# Patient Record
Sex: FEMALE | Race: WHITE | Employment: FULL TIME | URBAN - METROPOLITAN AREA
[De-identification: names, ages, dates, MRNs, and addresses within clinical notes are randomized per-mention and may not be internally consistent; named-entity substitution may affect disease eponyms.]

---

## 2017-01-19 RX ORDER — ESOMEPRAZOLE MAGNESIUM 40 MG/1
CAPSULE, DELAYED RELEASE ORAL
Qty: 180 CAPSULE | Refills: 0 | Status: SHIPPED | OUTPATIENT
Start: 2017-01-19 | End: 2017-03-31

## 2017-01-20 RX ORDER — ESOMEPRAZOLE MAGNESIUM 40 MG/1
CAPSULE, DELAYED RELEASE ORAL
Qty: 180 CAPSULE | Refills: 0 | Status: SHIPPED | OUTPATIENT
Start: 2017-01-20 | End: 2017-05-16

## 2017-03-17 ENCOUNTER — TELEPHONE (OUTPATIENT)
Dept: FAMILY MEDICINE CLINIC | Facility: CLINIC | Age: 57
End: 2017-03-17

## 2017-03-17 NOTE — TELEPHONE ENCOUNTER
Pt called ans stated that she over the weekend she was having cold like symptoms. Since then she has noticed water retention, SOB , heavy legs, dizziness and had a sharp pain in her chest 3 days ago that has gone away.   Pt denies fever nausea headaches o

## 2017-03-31 ENCOUNTER — APPOINTMENT (OUTPATIENT)
Dept: LAB | Age: 57
End: 2017-03-31
Attending: FAMILY MEDICINE
Payer: COMMERCIAL

## 2017-03-31 ENCOUNTER — OFFICE VISIT (OUTPATIENT)
Dept: FAMILY MEDICINE CLINIC | Facility: CLINIC | Age: 57
End: 2017-03-31

## 2017-03-31 ENCOUNTER — HOSPITAL ENCOUNTER (OUTPATIENT)
Dept: GENERAL RADIOLOGY | Age: 57
Discharge: HOME | End: 2017-03-31
Attending: FAMILY MEDICINE
Payer: COMMERCIAL

## 2017-03-31 VITALS
BODY MASS INDEX: 25.95 KG/M2 | DIASTOLIC BLOOD PRESSURE: 74 MMHG | RESPIRATION RATE: 16 BRPM | WEIGHT: 152 LBS | HEART RATE: 94 BPM | TEMPERATURE: 98 F | HEIGHT: 64 IN | OXYGEN SATURATION: 97 % | SYSTOLIC BLOOD PRESSURE: 134 MMHG

## 2017-03-31 DIAGNOSIS — R05.3 PERSISTENT COUGH FOR 3 WEEKS OR LONGER: ICD-10-CM

## 2017-03-31 DIAGNOSIS — R06.00 DYSPNEA ON EXERTION: ICD-10-CM

## 2017-03-31 DIAGNOSIS — R05.3 PERSISTENT COUGH FOR 3 WEEKS OR LONGER: Primary | ICD-10-CM

## 2017-03-31 DIAGNOSIS — E55.9 VITAMIN D DEFICIENCY: ICD-10-CM

## 2017-03-31 DIAGNOSIS — L65.9 HAIR LOSS: ICD-10-CM

## 2017-03-31 DIAGNOSIS — E78.00 PURE HYPERCHOLESTEROLEMIA: ICD-10-CM

## 2017-03-31 PROCEDURE — 80053 COMPREHEN METABOLIC PANEL: CPT

## 2017-03-31 PROCEDURE — 36415 COLL VENOUS BLD VENIPUNCTURE: CPT

## 2017-03-31 PROCEDURE — 99213 OFFICE O/P EST LOW 20 MIN: CPT | Performed by: FAMILY MEDICINE

## 2017-03-31 PROCEDURE — 80061 LIPID PANEL: CPT

## 2017-03-31 PROCEDURE — 84443 ASSAY THYROID STIM HORMONE: CPT

## 2017-03-31 PROCEDURE — 71020 XR CHEST PA + LAT CHEST (CPT=71020): CPT

## 2017-03-31 PROCEDURE — 82306 VITAMIN D 25 HYDROXY: CPT

## 2017-03-31 PROCEDURE — 84439 ASSAY OF FREE THYROXINE: CPT

## 2017-04-03 ENCOUNTER — HOSPITAL ENCOUNTER (OUTPATIENT)
Dept: CV DIAGNOSTICS | Facility: HOSPITAL | Age: 57
Discharge: HOME | End: 2017-04-03
Attending: FAMILY MEDICINE
Payer: COMMERCIAL

## 2017-04-03 ENCOUNTER — TELEPHONE (OUTPATIENT)
Dept: FAMILY MEDICINE CLINIC | Facility: CLINIC | Age: 57
End: 2017-04-03

## 2017-04-03 DIAGNOSIS — R06.00 DYSPNEA ON EXERTION: ICD-10-CM

## 2017-04-03 DIAGNOSIS — E78.00 PURE HYPERCHOLESTEROLEMIA: Primary | ICD-10-CM

## 2017-04-03 PROCEDURE — 93306 TTE W/DOPPLER COMPLETE: CPT

## 2017-04-03 PROCEDURE — 93306 TTE W/DOPPLER COMPLETE: CPT | Performed by: FAMILY MEDICINE

## 2017-04-03 NOTE — PROGRESS NOTES
HPI:   Danica Steven is a 61year old female who presents for a persistent dry cough and dyspnea on exertion for  3  weeks. Patient reports dry cough, denies fever.       Current Outpatient Prescriptions:  ESOMEPRAZOLE MAGNESIUM 40 MG Oral Capsule Delayed 3/13/2016    Comment Procedure: COLONOSCOPY;  Surgeon: Tere Moraels DO;  Location: [facility] ENDOSCOPY    KNEE REPLACEMENT SURGERY Right 3/7/2016      Family History   Problem Relation Age of Onset   • Heart Disease Father    • Diabetes Father    • Heart Dis worsen.

## 2017-05-12 ENCOUNTER — TELEPHONE (OUTPATIENT)
Dept: FAMILY MEDICINE CLINIC | Facility: CLINIC | Age: 57
End: 2017-05-12

## 2017-05-12 NOTE — TELEPHONE ENCOUNTER
Pt has questions about staring a water pill. Dr Coby Juárez will be out so we had to reschedule appt but pt has questions/ Please advise. Thank you.

## 2017-05-12 NOTE — TELEPHONE ENCOUNTER
S/w pt. She was r/s'd from May to July to discuss starting a possible water pill. I am not sure why so far out from orig appt. States when in cancun for a week she took two 25mg pills of her sisters that made her feel wonderful and really helped her edema.

## 2017-05-16 ENCOUNTER — OFFICE VISIT (OUTPATIENT)
Dept: FAMILY MEDICINE CLINIC | Facility: CLINIC | Age: 57
End: 2017-05-16

## 2017-05-16 VITALS
DIASTOLIC BLOOD PRESSURE: 78 MMHG | HEIGHT: 64 IN | SYSTOLIC BLOOD PRESSURE: 128 MMHG | RESPIRATION RATE: 16 BRPM | WEIGHT: 156 LBS | BODY MASS INDEX: 26.63 KG/M2 | TEMPERATURE: 98 F | HEART RATE: 80 BPM

## 2017-05-16 DIAGNOSIS — R00.2 PALPITATIONS: Primary | ICD-10-CM

## 2017-05-16 DIAGNOSIS — E78.00 PURE HYPERCHOLESTEROLEMIA: ICD-10-CM

## 2017-05-16 DIAGNOSIS — K21.00 REFLUX ESOPHAGITIS: ICD-10-CM

## 2017-05-16 DIAGNOSIS — R60.0 BILATERAL EDEMA OF LOWER EXTREMITY: ICD-10-CM

## 2017-05-16 PROCEDURE — 93000 ELECTROCARDIOGRAM COMPLETE: CPT | Performed by: FAMILY MEDICINE

## 2017-05-16 PROCEDURE — 99214 OFFICE O/P EST MOD 30 MIN: CPT | Performed by: FAMILY MEDICINE

## 2017-05-16 RX ORDER — ATORVASTATIN CALCIUM 40 MG/1
40 TABLET, FILM COATED ORAL NIGHTLY
Qty: 90 TABLET | Refills: 1 | Status: SHIPPED | OUTPATIENT
Start: 2017-05-16 | End: 2018-05-01

## 2017-05-16 RX ORDER — HYDROCHLOROTHIAZIDE 12.5 MG/1
12.5 CAPSULE, GELATIN COATED ORAL DAILY
Qty: 30 CAPSULE | Refills: 5 | Status: SHIPPED | OUTPATIENT
Start: 2017-05-16 | End: 2018-05-01

## 2017-05-16 RX ORDER — ESOMEPRAZOLE MAGNESIUM 40 MG/1
CAPSULE, DELAYED RELEASE ORAL
Qty: 180 CAPSULE | Refills: 1 | Status: SHIPPED | OUTPATIENT
Start: 2017-05-16 | End: 2018-05-01

## 2017-05-16 NOTE — PROGRESS NOTES
Yaz Ortega is a 61year old female. HPI:   Patient has 2 main issues. First is recurrent leg swelling particularly during hot days or with travel. She borrowed hydrochlorothiazide from a friend and felt that it gave her significant relief.   She woul • High cholesterol    • Glucose intolerance (pre-diabetes)    • Visual impairment      glasses   • Back problem    • Scleroderma (HCC)       Social History:    Smoking Status: Former Smoker                   Packs/Day: 0.00  Years:           Types: Cigar

## 2017-06-02 RX ORDER — ESOMEPRAZOLE MAGNESIUM 40 MG/1
CAPSULE, DELAYED RELEASE ORAL
Qty: 180 CAPSULE | Refills: 0 | Status: SHIPPED | OUTPATIENT
Start: 2017-06-02 | End: 2018-05-01

## 2018-03-16 ENCOUNTER — APPOINTMENT (OUTPATIENT)
Dept: LAB | Facility: HOSPITAL | Age: 58
End: 2018-03-16
Attending: FAMILY MEDICINE
Payer: COMMERCIAL

## 2018-03-16 DIAGNOSIS — E78.00 PURE HYPERCHOLESTEROLEMIA: ICD-10-CM

## 2018-03-16 LAB
ALBUMIN SERPL-MCNC: 3.6 G/DL (ref 3.5–4.8)
ALP LIVER SERPL-CCNC: 68 U/L (ref 46–118)
ALT SERPL-CCNC: 24 U/L (ref 14–54)
AST SERPL-CCNC: 25 U/L (ref 15–41)
BILIRUB SERPL-MCNC: 0.6 MG/DL (ref 0.1–2)
BUN BLD-MCNC: 9 MG/DL (ref 8–20)
CALCIUM BLD-MCNC: 9 MG/DL (ref 8.3–10.3)
CHLORIDE: 108 MMOL/L (ref 101–111)
CHOLEST SMN-MCNC: 190 MG/DL (ref ?–200)
CO2: 30 MMOL/L (ref 22–32)
CREAT BLD-MCNC: 0.69 MG/DL (ref 0.55–1.02)
GLUCOSE BLD-MCNC: 84 MG/DL (ref 70–99)
HDLC SERPL-MCNC: 79 MG/DL (ref 45–?)
HDLC SERPL: 2.41 {RATIO} (ref ?–4.44)
LDLC SERPL CALC-MCNC: 94 MG/DL (ref ?–130)
M PROTEIN MFR SERPL ELPH: 7 G/DL (ref 6.1–8.3)
NONHDLC SERPL-MCNC: 111 MG/DL (ref ?–130)
POTASSIUM SERPL-SCNC: 5 MMOL/L (ref 3.6–5.1)
SODIUM SERPL-SCNC: 143 MMOL/L (ref 136–144)
TRIGL SERPL-MCNC: 84 MG/DL (ref ?–150)
VLDLC SERPL CALC-MCNC: 17 MG/DL (ref 5–40)

## 2018-03-16 PROCEDURE — 80061 LIPID PANEL: CPT

## 2018-03-16 PROCEDURE — 80053 COMPREHEN METABOLIC PANEL: CPT

## 2018-03-16 PROCEDURE — 36415 COLL VENOUS BLD VENIPUNCTURE: CPT

## 2018-05-01 ENCOUNTER — OFFICE VISIT (OUTPATIENT)
Dept: FAMILY MEDICINE CLINIC | Facility: CLINIC | Age: 58
End: 2018-05-01

## 2018-05-01 VITALS
RESPIRATION RATE: 16 BRPM | HEART RATE: 72 BPM | DIASTOLIC BLOOD PRESSURE: 84 MMHG | TEMPERATURE: 99 F | HEIGHT: 64.5 IN | BODY MASS INDEX: 27.32 KG/M2 | WEIGHT: 162 LBS | SYSTOLIC BLOOD PRESSURE: 138 MMHG

## 2018-05-01 DIAGNOSIS — R63.5 WEIGHT GAIN: ICD-10-CM

## 2018-05-01 DIAGNOSIS — E55.9 VITAMIN D DEFICIENCY: ICD-10-CM

## 2018-05-01 DIAGNOSIS — K21.00 REFLUX ESOPHAGITIS: ICD-10-CM

## 2018-05-01 DIAGNOSIS — M34.9 SCLERODERMA (HCC): ICD-10-CM

## 2018-05-01 DIAGNOSIS — R60.0 BILATERAL EDEMA OF LOWER EXTREMITY: ICD-10-CM

## 2018-05-01 DIAGNOSIS — E78.00 PURE HYPERCHOLESTEROLEMIA: ICD-10-CM

## 2018-05-01 DIAGNOSIS — Z00.00 WELL WOMAN EXAM WITHOUT GYNECOLOGICAL EXAM: Primary | ICD-10-CM

## 2018-05-01 PROCEDURE — 99396 PREV VISIT EST AGE 40-64: CPT | Performed by: FAMILY MEDICINE

## 2018-05-01 RX ORDER — ESOMEPRAZOLE MAGNESIUM 40 MG/1
CAPSULE, DELAYED RELEASE ORAL
Qty: 180 CAPSULE | Refills: 1 | Status: SHIPPED | OUTPATIENT
Start: 2018-05-01 | End: 2018-12-04

## 2018-05-01 RX ORDER — HYDROCHLOROTHIAZIDE 25 MG/1
25 TABLET ORAL DAILY
Qty: 90 TABLET | Refills: 1 | Status: SHIPPED | OUTPATIENT
Start: 2018-05-01 | End: 2018-10-15

## 2018-05-01 RX ORDER — ATORVASTATIN CALCIUM 40 MG/1
40 TABLET, FILM COATED ORAL NIGHTLY
Qty: 90 TABLET | Refills: 1 | Status: SHIPPED | OUTPATIENT
Start: 2018-05-01 | End: 2019-02-14

## 2018-05-01 NOTE — PROGRESS NOTES
HPI:   Orlin Recinos is a 61year old female who presents for a complete physical exam. Symptoms: is S/P CALOS, ovaries preservedPatient's last menstrual period was 01/30/2010. Zelalem Chapman Patient does have prior history of an upper GI bleed.   She has been taking N Comment:headache    MEDICATIONS :       Current Outpatient Prescriptions:  Esomeprazole Magnesium 40 MG Oral Capsule Delayed Release TAKE 1 CAPSULE BY MOUTH TWICE DAILY Disp: 180 capsule Rfl: 1   atorvastatin 40 MG Oral Tab Take 1 tablet (40 mg total) by m Ru Pink at [address]  3/7/2016: KNEE REPLACEMENT SURGERY Right  06/27/06: OTHER SURGICAL HISTORY      Comment: esophagogastroduodenoscopy  11/2014: OTHER SURGICAL HISTORY Left      Comment: toe surgery-bunionectomy, fusion of bruits  BREAST: done by gyne  LUNGS: clear to auscultation  CARDIO: RRR without murmur  GI: good BS's,no masses, HSM or tenderness  :done by gyne   EXTREMITIES: no cyanosis, clubbing or edema  NEURO: Oriented times three,cranial nerves are intact,motor a

## 2018-05-17 ENCOUNTER — LAB ENCOUNTER (OUTPATIENT)
Dept: LAB | Age: 58
End: 2018-05-17
Attending: FAMILY MEDICINE
Payer: COMMERCIAL

## 2018-05-17 DIAGNOSIS — M34.9 SCLERODERMA (HCC): ICD-10-CM

## 2018-05-17 DIAGNOSIS — E55.9 VITAMIN D DEFICIENCY: ICD-10-CM

## 2018-05-17 DIAGNOSIS — K21.00 REFLUX ESOPHAGITIS: ICD-10-CM

## 2018-05-17 DIAGNOSIS — R63.5 WEIGHT GAIN: ICD-10-CM

## 2018-05-17 PROCEDURE — 84443 ASSAY THYROID STIM HORMONE: CPT | Performed by: FAMILY MEDICINE

## 2018-05-17 PROCEDURE — 85025 COMPLETE CBC W/AUTO DIFF WBC: CPT | Performed by: FAMILY MEDICINE

## 2018-05-17 PROCEDURE — 36415 COLL VENOUS BLD VENIPUNCTURE: CPT | Performed by: FAMILY MEDICINE

## 2018-05-17 PROCEDURE — 82306 VITAMIN D 25 HYDROXY: CPT | Performed by: FAMILY MEDICINE

## 2018-05-17 PROCEDURE — 84439 ASSAY OF FREE THYROXINE: CPT | Performed by: FAMILY MEDICINE

## 2018-05-18 DIAGNOSIS — E55.9 VITAMIN D DEFICIENCY: Primary | ICD-10-CM

## 2018-10-15 ENCOUNTER — TELEPHONE (OUTPATIENT)
Dept: FAMILY MEDICINE CLINIC | Facility: CLINIC | Age: 58
End: 2018-10-15

## 2018-10-15 RX ORDER — HYDROCHLOROTHIAZIDE 25 MG/1
TABLET ORAL
Qty: 90 TABLET | Refills: 0 | Status: SHIPPED | OUTPATIENT
Start: 2018-10-15 | End: 2019-01-09

## 2018-12-04 DIAGNOSIS — E78.00 PURE HYPERCHOLESTEROLEMIA: Primary | ICD-10-CM

## 2018-12-04 DIAGNOSIS — K21.00 REFLUX ESOPHAGITIS: ICD-10-CM

## 2018-12-04 RX ORDER — ESOMEPRAZOLE MAGNESIUM 40 MG/1
CAPSULE, DELAYED RELEASE ORAL
Qty: 180 CAPSULE | Refills: 0 | Status: SHIPPED | OUTPATIENT
Start: 2018-12-04 | End: 2019-02-14

## 2018-12-04 NOTE — TELEPHONE ENCOUNTER
LOV 05/01/18. Please call and make pt. A follow up appt. For cholesterol and gerd with Dr. Dominik Albright.

## 2018-12-05 NOTE — TELEPHONE ENCOUNTER
Pt made follow up appt for 1/28/19. Pt would also like to have blood work placed to do ahead of time. Please refill if allowed.

## 2018-12-06 ENCOUNTER — TELEPHONE (OUTPATIENT)
Dept: FAMILY MEDICINE CLINIC | Facility: CLINIC | Age: 58
End: 2018-12-06

## 2018-12-06 NOTE — TELEPHONE ENCOUNTER
Received request for a PA to be done for pt's esomeprazole magnesium 40mg. Form completed for [employer] and faxed to plan.

## 2018-12-07 NOTE — TELEPHONE ENCOUNTER
Letter of determination received from [employer], Esomeprazole mag 40mg dr capsules approved 12/4/18-12/4/19 for up to 60 caps a month. Case number [id number]  Called to [facility] spoke with pharmacist Anotnella Tapia. Antonella Tapia voiced understanding and agreed to plan.

## 2018-12-07 NOTE — TELEPHONE ENCOUNTER
Received call from [place] for more information. Spoke with Enzo Litten who stated that documentation should be sent to Support Review. Documentation printed and faxed.

## 2018-12-17 ENCOUNTER — TELEPHONE (OUTPATIENT)
Dept: FAMILY MEDICINE CLINIC | Facility: CLINIC | Age: 58
End: 2018-12-17

## 2018-12-17 NOTE — TELEPHONE ENCOUNTER
Received call from pt who stated that the insurance will not let her  a 90 day supply. That a form is being faxed over for a Quantity over ride to be filled out and marked urgent. Called to insurance spoke with Marcie.   I asked why medication wa

## 2019-01-10 RX ORDER — HYDROCHLOROTHIAZIDE 25 MG/1
TABLET ORAL
Qty: 90 TABLET | Refills: 0 | Status: SHIPPED | OUTPATIENT
Start: 2019-01-10 | End: 2019-04-09

## 2019-01-26 ENCOUNTER — APPOINTMENT (OUTPATIENT)
Dept: LAB | Facility: HOSPITAL | Age: 59
End: 2019-01-26
Attending: FAMILY MEDICINE
Payer: COMMERCIAL

## 2019-01-26 DIAGNOSIS — E78.00 PURE HYPERCHOLESTEROLEMIA: ICD-10-CM

## 2019-01-26 DIAGNOSIS — E55.9 VITAMIN D DEFICIENCY: ICD-10-CM

## 2019-01-26 LAB
ALBUMIN SERPL-MCNC: 3.9 G/DL (ref 3.1–4.5)
ALBUMIN/GLOB SERPL: 1.1 {RATIO} (ref 1–2)
ALP LIVER SERPL-CCNC: 69 U/L (ref 46–118)
ALT SERPL-CCNC: 23 U/L (ref 14–54)
ANION GAP SERPL CALC-SCNC: 3 MMOL/L (ref 0–18)
AST SERPL-CCNC: 26 U/L (ref 15–41)
BILIRUB SERPL-MCNC: 0.7 MG/DL (ref 0.1–2)
BUN BLD-MCNC: 10 MG/DL (ref 8–20)
BUN/CREAT SERPL: 14.5 (ref 10–20)
CALCIUM BLD-MCNC: 9 MG/DL (ref 8.3–10.3)
CHLORIDE SERPL-SCNC: 103 MMOL/L (ref 101–111)
CHOLEST SMN-MCNC: 212 MG/DL (ref ?–200)
CO2 SERPL-SCNC: 34 MMOL/L (ref 22–32)
CREAT BLD-MCNC: 0.69 MG/DL (ref 0.55–1.02)
GLOBULIN PLAS-MCNC: 3.5 G/DL (ref 2.8–4.4)
GLUCOSE BLD-MCNC: 92 MG/DL (ref 70–99)
HDLC SERPL-MCNC: 80 MG/DL (ref 40–59)
LDLC SERPL CALC-MCNC: 107 MG/DL (ref ?–100)
M PROTEIN MFR SERPL ELPH: 7.4 G/DL (ref 6.4–8.2)
NONHDLC SERPL-MCNC: 132 MG/DL (ref ?–130)
OSMOLALITY SERPL CALC.SUM OF ELEC: 289 MOSM/KG (ref 275–295)
POTASSIUM SERPL-SCNC: 4.1 MMOL/L (ref 3.6–5.1)
SODIUM SERPL-SCNC: 140 MMOL/L (ref 136–144)
TRIGL SERPL-MCNC: 126 MG/DL (ref 30–149)
VIT D+METAB SERPL-MCNC: 18.5 NG/ML (ref 30–100)
VLDLC SERPL CALC-MCNC: 25 MG/DL (ref 0–30)

## 2019-01-26 PROCEDURE — 80061 LIPID PANEL: CPT

## 2019-01-26 PROCEDURE — 80053 COMPREHEN METABOLIC PANEL: CPT

## 2019-01-26 PROCEDURE — 82306 VITAMIN D 25 HYDROXY: CPT

## 2019-01-26 PROCEDURE — 36415 COLL VENOUS BLD VENIPUNCTURE: CPT

## 2019-02-14 ENCOUNTER — OFFICE VISIT (OUTPATIENT)
Dept: FAMILY MEDICINE CLINIC | Facility: CLINIC | Age: 59
End: 2019-02-14
Payer: COMMERCIAL

## 2019-02-14 VITALS
SYSTOLIC BLOOD PRESSURE: 132 MMHG | WEIGHT: 164 LBS | HEART RATE: 96 BPM | HEIGHT: 64.5 IN | BODY MASS INDEX: 27.66 KG/M2 | DIASTOLIC BLOOD PRESSURE: 80 MMHG | RESPIRATION RATE: 16 BRPM | TEMPERATURE: 97 F

## 2019-02-14 DIAGNOSIS — E78.00 PURE HYPERCHOLESTEROLEMIA: ICD-10-CM

## 2019-02-14 DIAGNOSIS — K21.00 REFLUX ESOPHAGITIS: ICD-10-CM

## 2019-02-14 DIAGNOSIS — M34.9 SCLERODERMA (HCC): Primary | ICD-10-CM

## 2019-02-14 DIAGNOSIS — E55.9 VITAMIN D DEFICIENCY: ICD-10-CM

## 2019-02-14 PROCEDURE — 99214 OFFICE O/P EST MOD 30 MIN: CPT | Performed by: FAMILY MEDICINE

## 2019-02-14 RX ORDER — ESOMEPRAZOLE MAGNESIUM 40 MG/1
CAPSULE, DELAYED RELEASE ORAL
Qty: 180 CAPSULE | Refills: 3 | Status: SHIPPED | OUTPATIENT
Start: 2019-02-14 | End: 2020-07-16

## 2019-02-14 RX ORDER — CHOLECALCIFEROL (VITAMIN D3) 50 MCG
1.5 TABLET ORAL DAILY
Qty: 45 TABLET | Refills: 5 | COMMUNITY
Start: 2019-02-14 | End: 2019-03-16

## 2019-02-14 RX ORDER — ATORVASTATIN CALCIUM 40 MG/1
40 TABLET, FILM COATED ORAL NIGHTLY
Qty: 90 TABLET | Refills: 1 | Status: SHIPPED | OUTPATIENT
Start: 2019-02-14 | End: 2019-08-16

## 2019-02-14 NOTE — PROGRESS NOTES
Shaheed Mcnair is a 60year old female. HPI:   Patient is here for follow-up of her scleroderma, GERD and hypercholesterolemia. She has been taking Nexium and doing well. She does follow-up with GI. Did have a recent follow-up EGD.   Has been taking arnaldo Years since quittin.6      Smokeless tobacco: Never Used    Alcohol use:  Yes      Alcohol/week: 0.0 oz      Comment: 1 a week     Drug use: No       REVIEW OF SYSTEMS:   GENERAL HEALTH: feels well otherwise  SKIN: denies any unusual skin lesions or ra

## 2019-04-09 RX ORDER — HYDROCHLOROTHIAZIDE 25 MG/1
TABLET ORAL
Qty: 90 TABLET | Refills: 1 | Status: SHIPPED | OUTPATIENT
Start: 2019-04-09 | End: 2019-10-02

## 2019-08-06 ENCOUNTER — OFFICE VISIT (OUTPATIENT)
Dept: FAMILY MEDICINE CLINIC | Facility: CLINIC | Age: 59
End: 2019-08-06
Payer: COMMERCIAL

## 2019-08-06 ENCOUNTER — HOSPITAL ENCOUNTER (OUTPATIENT)
Dept: GENERAL RADIOLOGY | Facility: HOSPITAL | Age: 59
Discharge: HOME | End: 2019-08-06
Attending: FAMILY MEDICINE
Payer: COMMERCIAL

## 2019-08-06 ENCOUNTER — APPOINTMENT (OUTPATIENT)
Dept: CT IMAGING | Facility: HOSPITAL | Age: 59
DRG: 866 | End: 2019-08-06
Attending: EMERGENCY MEDICINE
Payer: COMMERCIAL

## 2019-08-06 ENCOUNTER — LAB ENCOUNTER (OUTPATIENT)
Dept: LAB | Facility: HOSPITAL | Age: 59
End: 2019-08-06
Attending: FAMILY MEDICINE
Payer: COMMERCIAL

## 2019-08-06 ENCOUNTER — HOSPITAL ENCOUNTER (INPATIENT)
Facility: HOSPITAL | Age: 59
LOS: 2 days | Discharge: HOME | DRG: 866 | End: 2019-08-08
Attending: EMERGENCY MEDICINE | Admitting: HOSPITALIST
Payer: COMMERCIAL

## 2019-08-06 VITALS
DIASTOLIC BLOOD PRESSURE: 80 MMHG | SYSTOLIC BLOOD PRESSURE: 132 MMHG | RESPIRATION RATE: 16 BRPM | BODY MASS INDEX: 29.85 KG/M2 | TEMPERATURE: 98 F | HEIGHT: 64.5 IN | OXYGEN SATURATION: 97 % | HEART RATE: 108 BPM | WEIGHT: 177 LBS

## 2019-08-06 DIAGNOSIS — R00.0 TACHYCARDIA: ICD-10-CM

## 2019-08-06 DIAGNOSIS — R00.0 TACHYCARDIA: Primary | ICD-10-CM

## 2019-08-06 DIAGNOSIS — R06.00 DYSPNEA, UNSPECIFIED TYPE: ICD-10-CM

## 2019-08-06 DIAGNOSIS — E78.00 PURE HYPERCHOLESTEROLEMIA: ICD-10-CM

## 2019-08-06 DIAGNOSIS — M34.9 SCLERODERMA (HCC): ICD-10-CM

## 2019-08-06 DIAGNOSIS — R06.00 EXERTIONAL DYSPNEA: Primary | ICD-10-CM

## 2019-08-06 DIAGNOSIS — K21.00 REFLUX ESOPHAGITIS: ICD-10-CM

## 2019-08-06 DIAGNOSIS — R53.83 OTHER FATIGUE: ICD-10-CM

## 2019-08-06 DIAGNOSIS — E55.9 VITAMIN D DEFICIENCY: ICD-10-CM

## 2019-08-06 DIAGNOSIS — R07.89 CHEST HEAVINESS: ICD-10-CM

## 2019-08-06 DIAGNOSIS — R06.00 DYSPNEA, UNSPECIFIED TYPE: Primary | ICD-10-CM

## 2019-08-06 PROBLEM — R06.09 EXERTIONAL DYSPNEA: Status: ACTIVE | Noted: 2019-08-06

## 2019-08-06 LAB
ALBUMIN SERPL-MCNC: 2.8 G/DL (ref 3.4–5)
ALBUMIN SERPL-MCNC: 2.9 G/DL (ref 3.4–5)
ALBUMIN/GLOB SERPL: 1 {RATIO} (ref 1–2)
ALBUMIN/GLOB SERPL: 1.1 {RATIO} (ref 1–2)
ALP LIVER SERPL-CCNC: 49 U/L (ref 46–118)
ALP LIVER SERPL-CCNC: 50 U/L (ref 46–118)
ALT SERPL-CCNC: 18 U/L (ref 13–56)
ALT SERPL-CCNC: 20 U/L (ref 13–56)
ANION GAP SERPL CALC-SCNC: 5 MMOL/L (ref 0–18)
ANION GAP SERPL CALC-SCNC: 7 MMOL/L (ref 0–18)
APTT PPP: 26.1 SECONDS (ref 25.4–36.1)
AST SERPL-CCNC: 29 U/L (ref 15–37)
AST SERPL-CCNC: 30 U/L (ref 15–37)
BASOPHILS # BLD AUTO: 0.02 X10(3) UL (ref 0–0.2)
BASOPHILS # BLD AUTO: 0.03 X10(3) UL (ref 0–0.2)
BASOPHILS NFR BLD AUTO: 0.2 %
BASOPHILS NFR BLD AUTO: 0.4 %
BILIRUB SERPL-MCNC: 0.5 MG/DL (ref 0.1–2)
BILIRUB SERPL-MCNC: 0.5 MG/DL (ref 0.1–2)
BUN BLD-MCNC: 7 MG/DL (ref 7–18)
BUN BLD-MCNC: 8 MG/DL (ref 7–18)
BUN/CREAT SERPL: 10.9 (ref 10–20)
BUN/CREAT SERPL: 11.8 (ref 10–20)
CALCIUM BLD-MCNC: 7.7 MG/DL (ref 8.5–10.1)
CALCIUM BLD-MCNC: 7.9 MG/DL (ref 8.5–10.1)
CHLORIDE SERPL-SCNC: 105 MMOL/L (ref 98–112)
CHLORIDE SERPL-SCNC: 106 MMOL/L (ref 98–112)
CO2 SERPL-SCNC: 26 MMOL/L (ref 21–32)
CO2 SERPL-SCNC: 27 MMOL/L (ref 21–32)
CREAT BLD-MCNC: 0.64 MG/DL (ref 0.55–1.02)
CREAT BLD-MCNC: 0.68 MG/DL (ref 0.55–1.02)
CRP SERPL-MCNC: 0.4 MG/DL (ref ?–0.3)
D-DIMER: 0.72 UG/ML FEU (ref ?–0.59)
DEPRECATED RDW RBC AUTO: 46.8 FL (ref 35.1–46.3)
DEPRECATED RDW RBC AUTO: 47.1 FL (ref 35.1–46.3)
EOSINOPHIL # BLD AUTO: 0.05 X10(3) UL (ref 0–0.7)
EOSINOPHIL # BLD AUTO: 0.06 X10(3) UL (ref 0–0.7)
EOSINOPHIL NFR BLD AUTO: 0.5 %
EOSINOPHIL NFR BLD AUTO: 0.8 %
ERYTHROCYTE [DISTWIDTH] IN BLOOD BY AUTOMATED COUNT: 13.7 % (ref 11–15)
ERYTHROCYTE [DISTWIDTH] IN BLOOD BY AUTOMATED COUNT: 13.8 % (ref 11–15)
GLOBULIN PLAS-MCNC: 2.6 G/DL (ref 2.8–4.4)
GLOBULIN PLAS-MCNC: 2.9 G/DL (ref 2.8–4.4)
GLUCOSE BLD-MCNC: 86 MG/DL (ref 70–99)
GLUCOSE BLD-MCNC: 87 MG/DL (ref 70–99)
HCT VFR BLD AUTO: 47.4 % (ref 35–48)
HCT VFR BLD AUTO: 48.3 % (ref 35–48)
HGB BLD-MCNC: 15.7 G/DL (ref 12–16)
HGB BLD-MCNC: 15.8 G/DL (ref 12–16)
IMM GRANULOCYTES # BLD AUTO: 0.03 X10(3) UL (ref 0–1)
IMM GRANULOCYTES # BLD AUTO: 0.04 X10(3) UL (ref 0–1)
IMM GRANULOCYTES NFR BLD: 0.3 %
IMM GRANULOCYTES NFR BLD: 0.5 %
INR BLD: 0.85 (ref 0.9–1.1)
LYMPHOCYTES # BLD AUTO: 1.88 X10(3) UL (ref 1–4)
LYMPHOCYTES # BLD AUTO: 2.08 X10(3) UL (ref 1–4)
LYMPHOCYTES NFR BLD AUTO: 20.3 %
LYMPHOCYTES NFR BLD AUTO: 24 %
M PROTEIN MFR SERPL ELPH: 5.4 G/DL (ref 6.4–8.2)
M PROTEIN MFR SERPL ELPH: 5.8 G/DL (ref 6.4–8.2)
MCH RBC QN AUTO: 30.4 PG (ref 26–34)
MCH RBC QN AUTO: 30.5 PG (ref 26–34)
MCHC RBC AUTO-ENTMCNC: 32.7 G/DL (ref 31–37)
MCHC RBC AUTO-ENTMCNC: 33.1 G/DL (ref 31–37)
MCV RBC AUTO: 92 FL (ref 80–100)
MCV RBC AUTO: 92.9 FL (ref 80–100)
MONOCYTES # BLD AUTO: 0.62 X10(3) UL (ref 0.1–1)
MONOCYTES # BLD AUTO: 0.81 X10(3) UL (ref 0.1–1)
MONOCYTES NFR BLD AUTO: 7.9 %
MONOCYTES NFR BLD AUTO: 7.9 %
NEUTROPHILS # BLD AUTO: 5.19 X10 (3) UL (ref 1.5–7.7)
NEUTROPHILS # BLD AUTO: 5.19 X10(3) UL (ref 1.5–7.7)
NEUTROPHILS # BLD AUTO: 7.28 X10 (3) UL (ref 1.5–7.7)
NEUTROPHILS # BLD AUTO: 7.28 X10(3) UL (ref 1.5–7.7)
NEUTROPHILS NFR BLD AUTO: 66.4 %
NEUTROPHILS NFR BLD AUTO: 70.8 %
NT-PROBNP SERPL-MCNC: 91 PG/ML (ref ?–125)
OSMOLALITY SERPL CALC.SUM OF ELEC: 283 MOSM/KG (ref 275–295)
OSMOLALITY SERPL CALC.SUM OF ELEC: 284 MOSM/KG (ref 275–295)
PLATELET # BLD AUTO: 152 10(3)UL (ref 150–450)
PLATELET # BLD AUTO: 171 10(3)UL (ref 150–450)
POTASSIUM SERPL-SCNC: 4.2 MMOL/L (ref 3.5–5.1)
POTASSIUM SERPL-SCNC: 4.5 MMOL/L (ref 3.5–5.1)
PSA SERPL DL<=0.01 NG/ML-MCNC: 11.9 SECONDS (ref 12.5–14.7)
RBC # BLD AUTO: 5.15 X10(6)UL (ref 3.8–5.3)
RBC # BLD AUTO: 5.2 X10(6)UL (ref 3.8–5.3)
SED RATE-ML: 6 MM/HR (ref 0–25)
SODIUM SERPL-SCNC: 138 MMOL/L (ref 136–145)
SODIUM SERPL-SCNC: 138 MMOL/L (ref 136–145)
TROPONIN I SERPL-MCNC: <0.045 NG/ML (ref ?–0.04)
TROPONIN I SERPL-MCNC: <0.045 NG/ML (ref ?–0.04)
VIT D+METAB SERPL-MCNC: 20.5 NG/ML (ref 30–100)
WBC # BLD AUTO: 10.3 X10(3) UL (ref 4–11)
WBC # BLD AUTO: 7.8 X10(3) UL (ref 4–11)

## 2019-08-06 PROCEDURE — 99215 OFFICE O/P EST HI 40 MIN: CPT | Performed by: FAMILY MEDICINE

## 2019-08-06 PROCEDURE — 71275 CT ANGIOGRAPHY CHEST: CPT | Performed by: EMERGENCY MEDICINE

## 2019-08-06 PROCEDURE — 86140 C-REACTIVE PROTEIN: CPT

## 2019-08-06 PROCEDURE — 85025 COMPLETE CBC W/AUTO DIFF WBC: CPT

## 2019-08-06 PROCEDURE — 71046 X-RAY EXAM CHEST 2 VIEWS: CPT | Performed by: FAMILY MEDICINE

## 2019-08-06 PROCEDURE — 82306 VITAMIN D 25 HYDROXY: CPT

## 2019-08-06 PROCEDURE — 80053 COMPREHEN METABOLIC PANEL: CPT

## 2019-08-06 PROCEDURE — 99223 1ST HOSP IP/OBS HIGH 75: CPT | Performed by: HOSPITALIST

## 2019-08-06 PROCEDURE — 36415 COLL VENOUS BLD VENIPUNCTURE: CPT

## 2019-08-06 PROCEDURE — 85378 FIBRIN DEGRADE SEMIQUANT: CPT

## 2019-08-06 PROCEDURE — 85652 RBC SED RATE AUTOMATED: CPT

## 2019-08-06 PROCEDURE — 84484 ASSAY OF TROPONIN QUANT: CPT

## 2019-08-06 PROCEDURE — 93000 ELECTROCARDIOGRAM COMPLETE: CPT | Performed by: FAMILY MEDICINE

## 2019-08-06 RX ORDER — ONDANSETRON 2 MG/ML
4 INJECTION INTRAMUSCULAR; INTRAVENOUS EVERY 4 HOURS PRN
Status: DISCONTINUED | OUTPATIENT
Start: 2019-08-06 | End: 2019-08-08

## 2019-08-06 RX ORDER — ACETAMINOPHEN 325 MG/1
650 TABLET ORAL EVERY 6 HOURS PRN
Status: DISCONTINUED | OUTPATIENT
Start: 2019-08-06 | End: 2019-08-08

## 2019-08-06 RX ORDER — ENOXAPARIN SODIUM 100 MG/ML
40 INJECTION SUBCUTANEOUS DAILY
Status: DISCONTINUED | OUTPATIENT
Start: 2019-08-06 | End: 2019-08-08

## 2019-08-06 RX ORDER — ASPIRIN 81 MG/1
324 TABLET, CHEWABLE ORAL ONCE
Status: COMPLETED | OUTPATIENT
Start: 2019-08-06 | End: 2019-08-06

## 2019-08-06 RX ORDER — ONDANSETRON 2 MG/ML
4 INJECTION INTRAMUSCULAR; INTRAVENOUS EVERY 6 HOURS PRN
Status: DISCONTINUED | OUTPATIENT
Start: 2019-08-06 | End: 2019-08-08

## 2019-08-06 RX ORDER — SODIUM CHLORIDE 9 MG/ML
INJECTION, SOLUTION INTRAVENOUS CONTINUOUS
Status: ACTIVE | OUTPATIENT
Start: 2019-08-06 | End: 2019-08-07

## 2019-08-06 RX ORDER — METOCLOPRAMIDE HYDROCHLORIDE 5 MG/ML
10 INJECTION INTRAMUSCULAR; INTRAVENOUS EVERY 8 HOURS PRN
Status: DISCONTINUED | OUTPATIENT
Start: 2019-08-06 | End: 2019-08-08

## 2019-08-06 NOTE — ED INITIAL ASSESSMENT (HPI)
C/o URI s/s with generalized fatigue. Reports her arms and legs feel heavy and she had a 7# wt gain in one day. Denies fevers.

## 2019-08-07 ENCOUNTER — APPOINTMENT (OUTPATIENT)
Dept: ULTRASOUND IMAGING | Facility: HOSPITAL | Age: 59
DRG: 866 | End: 2019-08-07
Attending: HOSPITALIST
Payer: COMMERCIAL

## 2019-08-07 ENCOUNTER — MED REC SCAN ONLY (OUTPATIENT)
Dept: FAMILY MEDICINE CLINIC | Facility: CLINIC | Age: 59
End: 2019-08-07

## 2019-08-07 ENCOUNTER — APPOINTMENT (OUTPATIENT)
Dept: CV DIAGNOSTICS | Facility: HOSPITAL | Age: 59
DRG: 866 | End: 2019-08-07
Attending: HOSPITALIST
Payer: COMMERCIAL

## 2019-08-07 LAB
CK SERPL-CCNC: 132 U/L (ref 26–192)
CORTIS SERPL-MCNC: 9.9 UG/DL
T4 FREE SERPL-MCNC: 1 NG/DL (ref 0.8–1.7)
TSI SER-ACNC: 6.49 MIU/ML (ref 0.36–3.74)

## 2019-08-07 PROCEDURE — 99254 IP/OBS CNSLTJ NEW/EST MOD 60: CPT | Performed by: INTERNAL MEDICINE

## 2019-08-07 PROCEDURE — 93970 EXTREMITY STUDY: CPT | Performed by: HOSPITALIST

## 2019-08-07 PROCEDURE — 99232 SBSQ HOSP IP/OBS MODERATE 35: CPT | Performed by: HOSPITALIST

## 2019-08-07 PROCEDURE — 93306 TTE W/DOPPLER COMPLETE: CPT | Performed by: HOSPITALIST

## 2019-08-07 RX ORDER — HYDROCHLOROTHIAZIDE 25 MG/1
25 TABLET ORAL DAILY
Status: DISCONTINUED | OUTPATIENT
Start: 2019-08-07 | End: 2019-08-08

## 2019-08-07 RX ORDER — ATORVASTATIN CALCIUM 40 MG/1
40 TABLET, FILM COATED ORAL NIGHTLY
Status: DISCONTINUED | OUTPATIENT
Start: 2019-08-07 | End: 2019-08-08

## 2019-08-07 RX ORDER — CALCIUM CARBONATE 200(500)MG
500 TABLET,CHEWABLE ORAL
Status: DISCONTINUED | OUTPATIENT
Start: 2019-08-07 | End: 2019-08-08

## 2019-08-07 RX ORDER — PANTOPRAZOLE SODIUM 40 MG/1
40 TABLET, DELAYED RELEASE ORAL
Status: DISCONTINUED | OUTPATIENT
Start: 2019-08-07 | End: 2019-08-08

## 2019-08-07 RX ORDER — PANTOPRAZOLE SODIUM 40 MG/1
40 TABLET, DELAYED RELEASE ORAL
Status: DISCONTINUED | OUTPATIENT
Start: 2019-08-07 | End: 2019-08-07

## 2019-08-07 NOTE — H&P
[facility]  History and Physical     Jewel Ames Patient Status:  Emergency    1960 MRN [id number]   Location [address] [facility] Attending Justa Vargas MD   Hosp Day # 0 PCP Morro Campbell MD     Chief Complain by Julee Madrigal DO at [facility] ENDOSCOPY   • COLONOSCOPY  06/27/06   • COLONOSCOPY N/A 3/13/2016    Performed by Julee Madrigal DO at [facility] ENDOSCOPY   • [address]  05/27/2004   • ENDOMETRIAL ABLATION     • ESOPHAGOGASTRODUODENOSCOPY ACID OR) Take 100 mg by mouth. Disp:  Rfl:    Omega-3 Fatty Acids (FISH OIL) 1200 MG Oral Capsule Delayed Release Take by mouth. Disp:  Rfl:    Aloe Oral Liquid Take by mouth.  4 ounces daily Disp:  Rfl:    Multiple Vitamins-Minerals (MULTIVITAMIN OR) Take 08/06/19  1710 08/06/19  1940   TROP <0.045 <0.045       Imaging: Imaging data reviewed in Epic. ASSESSMENT / PLAN:     1. Exertional dyspnea, ?anginal equivalent vs post viral cardiomyopathy  1.  Initial cardiac eval negative  2. 2D echo pending, cons

## 2019-08-07 NOTE — PROGRESS NOTES
08/06/19 2347 08/06/19 2349 08/06/19 2350   Vital Signs   /80 137/72 133/77   BP Location Right arm  --   --    BP Method Automatic  --   --    Patient Position Lying Sitting Standing     Pt denies dizziness/lightheadedness.

## 2019-08-07 NOTE — PROGRESS NOTES
Yaz Ortega is a 60year old female. HPI:   Patient is a 50-year-old female who has approximately 1 week history of cough and congestion. She states that over the past 3 days she has developed exertional dyspnea. Her arms and legs feel \"heavy\".   Sh Encounters:  08/06/19 : 178 lb 5.6 oz  08/06/19 : 177 lb  02/14/19 : 164 lb  05/01/18 : 162 lb  03/30/18 : 165 lb 9.6 oz  05/16/17 : 156 lb      GENERAL: well developed, well nourished, appearing tired and slightly dyspneic.     SKIN: no rashes,no suspiciou

## 2019-08-07 NOTE — PROGRESS NOTES
[facility]  Progress Note     Ebony Collier Patient Status:  Inpatient    1960 MRN [id number]   [facility] 8NE-A Attending Rogerio Delatorre MD   Hosp Day # 1 PCP Rohith Solomon MD     Chief Complaint:  Dyspnea   S:   Up in 3. Cards  Following   4. pulm eval   5. Trop P  Initial neg   6. Check CPK, cortisol    7. US legs   8. Vit D level low   9. c reactive 0.4   10. D dimer 0.72   2. DL, statin hold per cards   3. GERD  4. Scleroderma skin, lung nodules   5.  Nicotine depen

## 2019-08-07 NOTE — PLAN OF CARE
rec in bed pt and VS appear stable. Denies CP. SOB with exertion. Waiting echo. POC updated will cont to monitor PRN. Noted to be in -30;s symptomatic SOB labored breathing. Put back to bed oxygen applied MD here to assess.  Monitoring  Cards MD/

## 2019-08-07 NOTE — ED PROVIDER NOTES
Patient Seen in: [facility] Emergency Department    History   Patient presents with:  Fatigue (constitutional, neurologic)    Stated Complaint: weakness, has a cold    HPI    Patient is a 69-year-old female who presents emergency room with a history 3/13/2016    Performed by Tarun Moser DO at [facility] ENDOSCOPY   • [address]  05/27/2004   • ENDOMETRIAL ABLATION     • ESOPHAGOGASTRODUODENOSCOPY (EGD) N/A 3/12/2016    Performed by Tarun Moser DO at [facility] ENDOSCOPY   • HYSTERECT tenderness to palpation appreciated. There is no JVD. No meningeal signs or nuchal rigidity appreciated. No stridor. LUNGS: Clear to auscultation bilaterally with no wheeze. There is good equal air entry bilaterally. HEART: Regular rate and rhythm.  Bright Fee Please view results for these tests on the individual orders. RAINBOW DRAW BLUE   RAINBOW DRAW LAVENDER   RAINBOW DRAW LIGHT GREEN   RAINBOW DRAW GOLD     EKG    Rate, intervals and axes as noted on EKG Report.   Rate: 93  Rhythm: Sinus Rhythm dyspnea and heaviness across her chest and feeling like her arms and legs feel heavy.   The exact etiology of the patient's symptoms at this point are uncertain however in light of risk factors for heart problems and also tachycardia at the doctor's office

## 2019-08-07 NOTE — PLAN OF CARE
Assumed care of patient at . A/o x3   Denies Chest pain, sob, Lightheadedness, dizziness. Up and walking around with SBA. o2 walk completed.      1800: RN was called into room ppt upset thinking that generic omeprazole was never ordered, \"THAT'S

## 2019-08-07 NOTE — PAYOR COMM NOTE
--------------  ADMISSION REVIEW     Payor: Lee Pena S #:  [id number]  Authorization Number: [id number]    Admit date: 8/6/19  Admit time: 2335       Admitting Physician: Ravi Musa DO  Attending Physician:  Evelina Macdonald MD • Dysphagia, unspecified(557.20)    • Esophageal reflux    • Glucose intolerance (pre-diabetes)    • High cholesterol    • Lumbago    • Neoplasm of uncertain behavior of skin    • OTHER DISEASES     scleroderma   • Other malaise and fatigue    • Pure hyper ED Triage Vitals [08/06/19 7368]   BP (!) 152/95   Pulse 113   Resp 20   Temp 97.5 °F (36.4 °C)   Temp src Oral   SpO2 99 %   O2 Device None (Room air)       Current:BP (!) 174/95   Pulse 97   Temp 97.5 °F (36.4 °C) (Oral)   Resp 14   Ht 162.6 cm (5' 4\") All other components within normal limits   CBC W/ DIFFERENTIAL - Abnormal; Notable for the following components:    RDW-SD 46.8 (*)     All other components within normal limits   PRO BETA NATRIURETIC PEPTIDE - Normal   PTT, ACTIVATED - Normal   TROPONIN CONCLUSION:  1. No evidence of pulmonary embolism. 2. Prior noted pulmonary and pleural-based nodules reveal interval calcification consistent with calcified granulomas.   There are however also new subtle micro nodules within the superior segment of the ri Other fatigue    Disposition:  Admit  8/6/2019 10:15 pm    Follow-up:  No follow-up provider specified.       Medications Prescribed:  Current Discharge Medication List              Present on Admission  Date Reviewed: 8/6/2019          ICD-10-CM Noted POA Diagnosis Date   • Anemia, unspecified    • Back problem    • Chest pain, unspecified    • Cough    • Degeneration of lumbar or lumbosacral intervertebral disc    • Degeneration of meniscus of right knee    • Dysphagia, unspecified(399.78)    • Esophageal • Diabetes Father    • Other (CABG [Other]) Father    • Cancer Father         Lung   • Heart Disease Mother    • Heart Attack Mother         AMI        Allergies:   Norco [Hydrocodone-*    NAUSEA AND VOMITING  Procardia [Nifedipi*        Comment:headache Neurologic: No focal neurological deficits. CNII-XII grossly intact. Musculoskeletal: Moves all extremities. Extremities: Trace edema  Integument: No rashes or lesions. Psychiatric: Appropriate mood and affect.       Diagnostic Data:      Labs:  Recent Hosp Day # 1 PCP Varun Linares MD      Reason for Consultation:  Dyspnea                                              History of Present Illness:  Mohinder Abdi is a a(n) 60year old female.    Kori Newton Hope is a a(n) 60year old female. No prior his   Performed by Brandy Sherman DO at [facility] ENDOSCOPY   • [address]   05/27/2003   • ENDOMETRIAL ABLATION       • ESOPHAGOGASTRODUODENOSCOPY (EGD) N/A 3/12/2016     Performed by Brandy Sherman DO at [facility] ENDOSCOPY   • HYSTERECTOMY   0 •  Metoclopramide HCl (REGLAN) injection 10 mg, 10 mg, Intravenous, Q8H PRN     Review of Systems:   A comprehensive 10 point review of systems was completed. Pertinent positives and negatives are noted above in the the HPI.    Physical Exam: 08/06/19 1848 (!) 152/95 97.5 °F (36.4 °C) Oral 113 20 99 % 5' 4\" (1.626 m) 177 lb (80.3 kg)         Wt Readings from Last 6 Encounters:  08/06/19 : 178 lb 5.6 oz (80.9 kg)  08/06/19 : 177 lb (80.3 kg)  02/14/19 : 164 lb (74.4 kg)  05/01/18 : 162 lb (73.5 Consults signed by Tapan Medeiros MD at 8/7/2019 10:24 AM     Author: Tapan Medeiros MD Service: Cardiology Author Type: Physician   Filed: 8/7/2019 10:24 AM Date of Service: 8/7/2019 10:07 AM Status: Addendum   : Tapan Medeiros MD (y   Lumbar fusion, Dr. Grcaiela Murguia   •          x4   • CAPSULE ENDOSCOPY N/A 3/13/2016     Performed by Nasra Jaffe DO at [facility] ENDOSCOPY   • COLONOSCOPY   06   • COLONOSCOPY N/A 3/13/2016     Performed by Nasra Jaffe DO at [facility] ENDOSCOPY •  acetaminophen (TYLENOL) tab 650 mg, 650 mg, Oral, Q6H PRN  •  ondansetron HCl (ZOFRAN) injection 4 mg, 4 mg, Intravenous, Q6H PRN  •  Metoclopramide HCl (REGLAN) injection 10 mg, 10 mg, Intravenous, Q8H PRN     Review of Systems:  All systems were revie Pure hypercholesterolemia     Raynaud's syndrome     Neck pain, bilateral     Acquired spondylolisthesis     Brachial neuritis or radiculitis NOS     Rash and nonspecific skin eruption     Tear of medial cartilage or meniscus of knee, current Review of Systems:   6 point ROS completed and was negative, except for pertinent positive and negatives stated in subjective.   Vital signs:  Temp:  [97.4 °F (36.3 °C)-97.9 °F (36.6 °C)] 97.6 °F (36.4 °C)  Pulse:  [] 106  Resp:  [13-22] 19  BP: (130- c reactive elevated, Vit D level low   hydrodiuril daily   Quality:  · DVT Prophylaxis: lovenox  · CODE status: Full  · Lee: no  ·    Will the patient be referred to TCC on discharge?: TBD  Estimated date of discharge:  tbd  Discharge is dependent on:  I

## 2019-08-07 NOTE — PLAN OF CARE
NURSING ADMISSION NOTE      Patient admitted via Cart  Oriented to room. Safety precautions initiated. Bed in low position. Call light in reach. Admission navigator completed. Pt AOx4. O2 sats maintained on RA.  Pt reports WILD for longer distance

## 2019-08-07 NOTE — CONSULTS
[facility]  Report of Consultation    Luis Alberto Villanueva Patient Status:  Inpatient    1960 MRN [id number]   [facility] 8NE-A Attending Martin Flores MD   [facility] Day # 1 PCP Kitty Huertas MD     Reason for Consultation:  Dyspnea ENDOSCOPY   • COLONOSCOPY  06/27/06   • COLONOSCOPY N/A 3/13/2016    Performed by Brandy Sherman DO at [facility] ENDOSCOPY   • [address]  05/27/2004   • ENDOMETRIAL ABLATION     • ESOPHAGOGASTRODUODENOSCOPY (EGD) N/A 3/12/2016    Perform Metoclopramide HCl (REGLAN) injection 10 mg, 10 mg, Intravenous, Q8H PRN    Review of Systems:   A comprehensive 10 point review of systems was completed. Pertinent positives and negatives are noted above in the the HPI.    Physical Exam:   General: alert, kg)  03/30/18 : 165 lb 9.6 oz (75.1 kg)  05/16/17 : 156 lb (70.8 kg)    Intake/Output:  [unfilled]  @[employer] SHIFT@    Lab Data Review:  Recent Labs     08/06/19 1710 08/06/19 1940   WBC 7.8 10.3   HGB 15.8 15.7   .0 152.0     Recent Labs     0

## 2019-08-07 NOTE — PROCEDURES
This test includes spirometry and flow volume loop done at the bedside during an inpatient hospitalization. Spirometry and flow volume loop appear normal with no evidence of airway obstruction or restriction.       Spirometry should be interpreted with c

## 2019-08-07 NOTE — CONSULTS
[facility]    Report of Consultation    [facility] Patient Status:  Inpatient    1960 MRN [id number]   [facility] 8NE-A Attending Herber Simental MD   [facility] Day # 1 PCP Vinay Miller MD     Date of Admission:  2019 HYSTERECTOMY  05/03/10    TVH, Rt. salpingectomy   • HYSTEROSCOPY WITH ENDOMETRIAL ABLATION N/A 11/11/2009    Performed by Monique Granda MD at [address] [place] [address]   • KNEE REPLACEMENT SURGERY Right 3/7/2016   • OTHER SURGICAL HISTORY  06/27/06    e menstrual period 2010, SpO2 98 %.   Temp (24hrs), Av.6 °F (36.4 °C), Min:97.4 °F (36.3 °C), Max:97.9 °F (36.6 °C)    Wt Readings from Last 3 Encounters:  19 : 178 lb 5.6 oz (80.9 kg)  19 : 177 lb (80.3 kg)  19 : 164 lb (74.4 kg) knee replacement     Vitamin D deficiency     Well woman exam without gynecological exam     Palpitations     Bilateral edema of lower extremity     Weight gain     Exertional dyspnea     Chest heaviness     Other fatigue      Exertional dyspnea and genera

## 2019-08-07 NOTE — ED NOTES
Patient updated on POC for admission. Awaiting room assignment. Ambulatory to bathroom and back with steady gait. Will continue to monitor.   Denies any needs at this time

## 2019-08-07 NOTE — PROGRESS NOTES
08/07/19 1711   Mobility   O2 walk?  Yes   SPO2 on Room Air at Rest 90   SPO2 Ambulation on Room Air 96   Ambulation oxygen flow (liters per minute) 0

## 2019-08-08 VITALS
HEIGHT: 64 IN | DIASTOLIC BLOOD PRESSURE: 62 MMHG | WEIGHT: 178.38 LBS | OXYGEN SATURATION: 99 % | HEART RATE: 128 BPM | SYSTOLIC BLOOD PRESSURE: 123 MMHG | RESPIRATION RATE: 18 BRPM | BODY MASS INDEX: 30.45 KG/M2 | TEMPERATURE: 98 F

## 2019-08-08 LAB
ATRIAL RATE: 93 BPM
P AXIS: 67 DEGREES
P-R INTERVAL: 142 MS
Q-T INTERVAL: 370 MS
QRS DURATION: 100 MS
QTC CALCULATION (BEZET): 460 MS
R AXIS: 29 DEGREES
T AXIS: 51 DEGREES
VENTRICULAR RATE: 93 BPM

## 2019-08-08 PROCEDURE — 99239 HOSP IP/OBS DSCHRG MGMT >30: CPT | Performed by: HOSPITALIST

## 2019-08-08 PROCEDURE — 99254 IP/OBS CNSLTJ NEW/EST MOD 60: CPT | Performed by: INTERNAL MEDICINE

## 2019-08-08 NOTE — OCCUPATIONAL THERAPY NOTE
OCCUPATIONAL THERAPY QUICK EVALUATION - INPATIENT    Room Number: 9474/1452-M  Evaluation Date: 8/8/2019     Type of Evaluation: Initial  Presenting Problem: post viral weakness    Physician Order: IP Consult to Occupational Therapy  Reason for Therapy:  A TVH, Rt. salpingectomy   • HYSTEROSCOPY WITH ENDOMETRIAL ABLATION N/A 11/11/2009    Performed by Marina Arreola MD at [address] [place] [address] Right 3/7/2016   • OTHER SURGICAL HISTORY  06/27/06    esophagogastroduodenoscopy Eating meals?: None    AM-PAC Score:  Score: 21  Approx Degree of Impairment: 32.79%  Standardized Score (AM-PAC Scale): 44.27  CMS Modifier (G-Code): CJ    FUNCTIONAL TRANSFER ASSESSMENT  Supine to Sit : Supervision  Sit to Stand: Supervision    Skilled T needs at this time. Patient discharged from Occupational Therapy services. Please re-order if a new functional limitation presents during this admission.     Patient was able to achieve the following goals:  Patient able to toilet transfer: at previous fu

## 2019-08-08 NOTE — PROGRESS NOTES
[facility]  Progress Note    Solange Hall Patient Status:  Inpatient    1960 MRN [id number]   [facility] 8NE-A Attending Jose Francisco Martin MD   [facility] Day # 2 PCP Vishal Vasquez MD     Subjective:  Solange Hall is a 60year old No results for input(s): ABGPHT, SZUQZK4K, PJHFA0P, ABGHCO3, ABGBE, TEMP, MARQUITA, SITE, DEV, THGB in the last 168 hours.     Invalid input(s): [id number], ANDREAS    Invalid input(s): [address], [address], [address], [address]    Radiology:  LE Dopplers - neg

## 2019-08-08 NOTE — PLAN OF CARE
RN SHIFT NOTE    Assumed  care of pt at 1900. Pt denies pain. Pt is alert and oriented x 4. Pt is NSR on tele, S1 and S2 present and pt denies cardiac symptoms. Lung sounds diminished bilaterally. Abdomen soft and round. Last known BM on 8/7/19.  Non-pittin

## 2019-08-08 NOTE — CONSULTS
Jenise Nash  9/5/1960           [address] CONSULT NOTE    Reason for consult: dyspnea      HPI:    60year old female with h/o of scleroderma, no prior CV h/o with recent cold, URI, persistent cough, who developed generalized leg heaviness/weakness and exerti Types: Cigarettes        Quit date: 1980        Years since quittin.1      Smokeless tobacco: Never Used    Substance and Sexual Activity      Alcohol use: Yes        Alcohol/week: 0.0 standard drinks        Comment: 1 a week       Drug use:  No blood in stools. Neurologic: No seizures, tremors, + weakness or numbness. PE:   Blood pressure 119/61, pulse 87, temperature 97.9 °F (36.6 °C), temperature source Oral, resp.  rate 16, height 162.6 cm (5' 4\"), weight 178 lb 5.6 oz (80.9 kg), last me DVT of the bilateral lower extremities. Dictated by: Juliana Wilburn MD on 8/07/2019 at 20:53     Approved by: Juliana Wilburn MD            CT chest:  1. No evidence of pulmonary embolism.   2. Prior noted pulmonary and pleural-based nodules reveal interval / RV function. Normal estimated RVSP. Plan:  1. Reviewed EKG / Echo in detail      - tissue doppler not suggestive of elevated filling pressures, stable atrial size, stable RV function.   No clear signs of HFpEF or PAH     - only Grade I DD , likely re

## 2019-08-08 NOTE — PLAN OF CARE
Denies c/o pain, malaise, or cardiac symptoms. Only complaint is \"It's just my cough\". Offered to request something to ease her cough, but at this juncture she declined it since she is cleared to go home. Remains in NSR, NL S1, S2, RRR.   Lungs diminis

## 2019-08-08 NOTE — PROGRESS NOTES
[facility]  Progress Note     Duane Beer Patient Status:  Inpatient    1960 MRN [id number]   [facility] 8NE-A Attending Elizabeth Avila MD   Hosp Day # 2 PCP Dominique Phelan MD     Chief Complaint:  Dyspnea   S:     BMDJ statin    3. GERD  4. Scleroderma skin, lung nodules   5. Nicotine dependenceper pt quit when she was 22 yo   6.  Calcified granulomas on CT chest,  No need for f/u per pulm      PLAN  Monitor BP  - monitor at work BID   Needs f/u echo 2-3 yrs  No need for

## 2019-08-08 NOTE — PHYSICAL THERAPY NOTE
PHYSICAL THERAPY QUICK EVALUATION - INPATIENT    Room Number: 9696/5390-B  Evaluation Date: 8/8/2019  Presenting Problem: Exertional dyspnea, chest heaviness  Physician Order: PT Eval and Treat     History Related to Current Admission:Pt admitted w/ rece [address]   • KNEE REPLACEMENT SURGERY Right 3/7/2016   • OTHER SURGICAL HISTORY  06/27/06    esophagogastroduodenoscopy   • OTHER SURGICAL HISTORY Left 11/2014    toe surgery-bunionectomy, fusion of toe   • POSTERIOR LUMBAR LAMINECT SPINAL FUS AM-PAC Score:  Raw Score: 23   Approx Degree of Impairment: 11.2%   Standardized Score (AM-PAC Scale): 56.93   CMS Modifier (G-Code): CI      FUNCTIONAL ABILITY STATUS  Gait Assessment  Gait Assistance: Modified independent  Distance (ft): [address] of Session: Up in chair;Needs met;Call light within reach;RN aware of session/findings; All patient questions and concerns addressed(Rn Yumi aware of eval session, recommendations)    ASSESSMENT   Patient is a 60year old female admitted on 8/6/2019 for exe

## 2019-08-09 ENCOUNTER — PATIENT OUTREACH (OUTPATIENT)
Dept: CASE MANAGEMENT | Age: 59
End: 2019-08-09

## 2019-08-09 DIAGNOSIS — R06.00 EXERTIONAL DYSPNEA: ICD-10-CM

## 2019-08-09 DIAGNOSIS — G93.3 POST VIRAL SYNDROME: ICD-10-CM

## 2019-08-09 DIAGNOSIS — R07.89 CHEST HEAVINESS: ICD-10-CM

## 2019-08-09 DIAGNOSIS — Z02.9 ENCOUNTERS FOR UNSPECIFIED ADMINISTRATIVE PURPOSE: ICD-10-CM

## 2019-08-09 PROCEDURE — 1111F DSCHRG MED/CURRENT MED MERGE: CPT

## 2019-08-09 NOTE — PAYOR COMM NOTE
--------------  DISCHARGE REVIEW    Payor: IVON MEEK  Subscriber #:  [id number]  Authorization Number: [id number]    Admit date: 8/6/19  Admit time:  2335  Discharge Date: 8/8/2019  1:31 PM     Admitting Physician: DO Vincent Madrigal

## 2019-08-09 NOTE — DISCHARGE SUMMARY
[facility]  DISCHARGE SUMMARY     Jaspal Castillo Patient Status:  Inpatient    1960 MRN [id number]   [facility] 8NE-A Attending No att. providers found   Hosp Day # 2 PCP Esther Arriaga MD     Date of Admission: 2019  Da any recent travel. Brief Synopsis:   41-year-old presented with complaints of dyspnea lower extremity edema. Patient had recent viral illness. Patient evaluated by cardiology and pulmonology.   Patient with complaints of cough nonproductive needed O2 a the liver measuring 1.5 cm, most likely representative of a small cyst or hemangioma  •   •   •  ultrasound legs negative for DVT  • Echocardiogram - 1. Left ventricle:  The cavity size was normal. Wall thickness was normal.  Systolic function was normal. T Take by mouth. Refills:  0     HYALURONIC ACID OR      Take 100 mg by mouth.    Refills:  0     hydrochlorothiazide 25 MG Tabs  Commonly known as:  HYDRODIURIL      TAKE 1 TABLET(25 MG) BY MOUTH DAILY   Quantity:  90 tablet  Refills:  1     MULTIVITAMIN O

## 2019-08-12 NOTE — PROGRESS NOTES
Initial Post Discharge Follow Up   Discharge Date: 8/8/19  Contact Date: 8/9/2019    Consent Verification:  Assessment Completed With: Patient  HIPAA Verified?   Yes     Discharge Dx:    Exertional dyspnea, chest heaviness, Post viral syndrome    Was TCC Rfl: 3   atorvastatin 40 MG Oral Tab Take 1 tablet (40 mg total) by mouth nightly. Disp: 90 tablet Rfl: 1   BIOTIN OR Take by mouth. Disp:  Rfl:    Hyaluronic Acid-Vitamin C (HYALURONIC ACID OR) Take 100 mg by mouth.  Disp:  Rfl:    Omega-3 Fatty Acids (FIS appointments? yes    Is there any reason as to why you cannot make your appointments? No     NCM Reviewed upcoming Specialist Appt with patient     Yes, Patient will schedule a follow up with Dr. Esteban Wyatt as needed.       Interventions by NCM: CIRILO reviewed

## 2019-08-16 ENCOUNTER — OFFICE VISIT (OUTPATIENT)
Dept: FAMILY MEDICINE CLINIC | Facility: CLINIC | Age: 59
End: 2019-08-16
Payer: COMMERCIAL

## 2019-08-16 VITALS
WEIGHT: 167 LBS | RESPIRATION RATE: 14 BRPM | DIASTOLIC BLOOD PRESSURE: 70 MMHG | HEIGHT: 64.5 IN | BODY MASS INDEX: 28.16 KG/M2 | HEART RATE: 82 BPM | SYSTOLIC BLOOD PRESSURE: 122 MMHG

## 2019-08-16 DIAGNOSIS — E78.00 PURE HYPERCHOLESTEROLEMIA: ICD-10-CM

## 2019-08-16 DIAGNOSIS — M34.9 SCLERODERMA (HCC): ICD-10-CM

## 2019-08-16 DIAGNOSIS — E55.9 VITAMIN D DEFICIENCY: ICD-10-CM

## 2019-08-16 DIAGNOSIS — G93.3 POST VIRAL SYNDROME: Primary | ICD-10-CM

## 2019-08-16 PROCEDURE — 99495 TRANSJ CARE MGMT MOD F2F 14D: CPT | Performed by: FAMILY MEDICINE

## 2019-08-16 RX ORDER — ATORVASTATIN CALCIUM 40 MG/1
40 TABLET, FILM COATED ORAL NIGHTLY
Qty: 90 TABLET | Refills: 1 | Status: SHIPPED | OUTPATIENT
Start: 2019-08-16 | End: 2020-04-16

## 2019-08-16 NOTE — PROGRESS NOTES
HPI:    Fern Quezada is a 60year old female here today for hospital follow up.    She was discharged from Inpatient hospital, [facility] to Home   Admission Date: 8/6/19   Discharge Date: 8/8/19  Hospital Discharge Diagnoses (since 7/17/2019) medical floor and evaluated by cardiology and pulmonology. She had had a nonproductive cough. Cardiac work-up was negative echo showed no heart failure or pulmonary hypertension. No evidence of pulmonary embolism.   Small nodules were seen on CTA but pu behavior of skin, OTHER DISEASES, Other malaise and fatigue, Pure hypercholesterolemia, Scleroderma ([address] and Visual impairment.     She  has a past surgical history that includes hysteroscopy,with endometrial (09); ; endometrial ablation; h well nourished, in no apparent distress  SKIN: no rashes, no suspicious lesions  HEENT: atraumatic, normocephalic, ears and throat are clear  EYES: PERRLA, EOMI, conjunctiva are clear  NECK: supple, no adenopathy, no bruits  CHEST: no chest tenderness  CONNER

## 2019-09-25 ENCOUNTER — TELEPHONE (OUTPATIENT)
Dept: FAMILY MEDICINE CLINIC | Facility: CLINIC | Age: 59
End: 2019-09-25

## 2019-09-25 NOTE — TELEPHONE ENCOUNTER
Please verify that patient is aware that Dr. Denver Risser is out of the office this week. We will hold this for him to review.

## 2019-09-25 NOTE — TELEPHONE ENCOUNTER
Pt's children gave her a trip to [address] [place] [address] for her birthday. She returned it because she does not want to fly that far and does not even thin she could physically handle it.  The only way they can get the money for the plane tickets it to have a note from her pr

## 2019-09-26 ENCOUNTER — APPOINTMENT (OUTPATIENT)
Dept: LAB | Age: 59
End: 2019-09-26
Attending: FAMILY MEDICINE
Payer: COMMERCIAL

## 2019-09-26 DIAGNOSIS — E78.00 PURE HYPERCHOLESTEROLEMIA: ICD-10-CM

## 2019-09-26 LAB
CHOLEST SMN-MCNC: 204 MG/DL (ref ?–200)
HDLC SERPL-MCNC: 84 MG/DL (ref 40–59)
LDLC SERPL CALC-MCNC: 98 MG/DL (ref ?–100)
NONHDLC SERPL-MCNC: 120 MG/DL (ref ?–130)
TRIGL SERPL-MCNC: 111 MG/DL (ref 30–149)
VLDLC SERPL CALC-MCNC: 22 MG/DL (ref 0–30)

## 2019-09-26 PROCEDURE — 80061 LIPID PANEL: CPT | Performed by: FAMILY MEDICINE

## 2019-09-26 PROCEDURE — 36415 COLL VENOUS BLD VENIPUNCTURE: CPT | Performed by: FAMILY MEDICINE

## 2019-10-02 RX ORDER — HYDROCHLOROTHIAZIDE 25 MG/1
TABLET ORAL
Qty: 90 TABLET | Refills: 0 | Status: SHIPPED | OUTPATIENT
Start: 2019-10-02 | End: 2020-01-06

## 2019-10-31 ENCOUNTER — OFFICE VISIT (OUTPATIENT)
Dept: FAMILY MEDICINE CLINIC | Facility: CLINIC | Age: 59
End: 2019-10-31
Payer: COMMERCIAL

## 2019-10-31 VITALS
SYSTOLIC BLOOD PRESSURE: 134 MMHG | BODY MASS INDEX: 28.5 KG/M2 | HEIGHT: 64.5 IN | RESPIRATION RATE: 12 BRPM | TEMPERATURE: 97 F | DIASTOLIC BLOOD PRESSURE: 82 MMHG | WEIGHT: 169 LBS | HEART RATE: 80 BPM

## 2019-10-31 DIAGNOSIS — Z00.00 WELL WOMAN EXAM WITHOUT GYNECOLOGICAL EXAM: Primary | ICD-10-CM

## 2019-10-31 DIAGNOSIS — E55.9 VITAMIN D DEFICIENCY: ICD-10-CM

## 2019-10-31 DIAGNOSIS — Z23 NEED FOR VACCINATION: ICD-10-CM

## 2019-10-31 DIAGNOSIS — K21.00 REFLUX ESOPHAGITIS: ICD-10-CM

## 2019-10-31 DIAGNOSIS — M34.9 SCLERODERMA (HCC): ICD-10-CM

## 2019-10-31 DIAGNOSIS — E78.00 PURE HYPERCHOLESTEROLEMIA: ICD-10-CM

## 2019-10-31 PROCEDURE — 90471 IMMUNIZATION ADMIN: CPT | Performed by: FAMILY MEDICINE

## 2019-10-31 PROCEDURE — 90686 IIV4 VACC NO PRSV 0.5 ML IM: CPT | Performed by: FAMILY MEDICINE

## 2019-10-31 PROCEDURE — 99396 PREV VISIT EST AGE 40-64: CPT | Performed by: FAMILY MEDICINE

## 2019-10-31 NOTE — PROGRESS NOTES
HPI:   Comfort Faria is a 60year old female who presents for a complete physical exam. Symptoms: is S/P CALOS, ovaries preservedPatient's last menstrual period was 01/30/2010. Evan Gautam Patient does have prior history of an upper GI bleed.   She continues to do w 40 MG Oral Tab Take 1 tablet (40 mg total) by mouth nightly. 90 tablet 1   • cholecalciferol (VITAMIN D3) 5000 units Oral Cap Take 5,000 Units by mouth daily.  Patient states she is taking 2 capsules daily     • Esomeprazole Magnesium 40 MG Oral Capsule Del Stu Corona MD at [facility]   • KNEE REPLACEMENT SURGERY Right 3/7/2016   • OTHER SURGICAL HISTORY  06/27/06    esophagogastroduodenoscopy   • OTHER SURGICAL HISTORY Left 11/2014    toe surgery-bunionectomy, fusion of toe   • POSTERIOR lesions  HEENT: atraumatic, normocephalic,ears and throat are clear  EYES:PERRLA, EOMI, normal optic disk,conjunctiva are clear  NECK: supple,no adenopathy,no bruits  BREAST: done by gyne  LUNGS: clear to auscultation  CARDIO: RRR without murmur  GI: good

## 2020-01-06 RX ORDER — HYDROCHLOROTHIAZIDE 25 MG/1
TABLET ORAL
Qty: 90 TABLET | Refills: 1 | Status: SHIPPED | OUTPATIENT
Start: 2020-01-06 | End: 2020-06-29

## 2020-02-04 ENCOUNTER — MED REC SCAN ONLY (OUTPATIENT)
Dept: FAMILY MEDICINE CLINIC | Facility: CLINIC | Age: 60
End: 2020-02-04

## 2020-04-16 DIAGNOSIS — E78.00 PURE HYPERCHOLESTEROLEMIA: ICD-10-CM

## 2020-04-16 RX ORDER — ATORVASTATIN CALCIUM 40 MG/1
TABLET, FILM COATED ORAL
Qty: 90 TABLET | Refills: 0 | Status: SHIPPED | OUTPATIENT
Start: 2020-04-16 | End: 2020-07-16

## 2020-04-16 NOTE — TELEPHONE ENCOUNTER
LOV 10/31/2019. Pt is due for a follow up visit this month. She is also due for fasting labs. Do you want her to do a tele visit?  Please advise

## 2020-07-01 RX ORDER — HYDROCHLOROTHIAZIDE 25 MG/1
TABLET ORAL
Qty: 90 TABLET | Refills: 0 | Status: SHIPPED | OUTPATIENT
Start: 2020-07-01 | End: 2020-11-09

## 2020-07-01 NOTE — TELEPHONE ENCOUNTER
HYDROCHLOROTHIAZIDE 25MG TABLETS  Hypertension Medications Protocol Failed. The pt is due to schedule an appt. A [employer] message was sent to the pt to call and schedule an appt. Please see pended medications. Please sign if appropriate.       Raine Howell

## 2020-07-16 DIAGNOSIS — E78.00 PURE HYPERCHOLESTEROLEMIA: ICD-10-CM

## 2020-07-16 DIAGNOSIS — K21.00 REFLUX ESOPHAGITIS: ICD-10-CM

## 2020-07-16 RX ORDER — ATORVASTATIN CALCIUM 40 MG/1
TABLET, FILM COATED ORAL
Qty: 90 TABLET | Refills: 0 | Status: SHIPPED | OUTPATIENT
Start: 2020-07-16 | End: 2020-10-16

## 2020-07-16 RX ORDER — ESOMEPRAZOLE MAGNESIUM 40 MG/1
CAPSULE, DELAYED RELEASE ORAL
Qty: 180 CAPSULE | Refills: 0 | Status: SHIPPED | OUTPATIENT
Start: 2020-07-16 | End: 2020-10-16

## 2020-10-16 ENCOUNTER — TELEPHONE (OUTPATIENT)
Dept: FAMILY MEDICINE CLINIC | Facility: CLINIC | Age: 60
End: 2020-10-16

## 2020-10-16 DIAGNOSIS — E78.00 PURE HYPERCHOLESTEROLEMIA: ICD-10-CM

## 2020-10-16 DIAGNOSIS — K21.00 REFLUX ESOPHAGITIS: ICD-10-CM

## 2020-10-16 RX ORDER — ATORVASTATIN CALCIUM 40 MG/1
40 TABLET, FILM COATED ORAL NIGHTLY
Qty: 30 TABLET | Refills: 0 | Status: SHIPPED | OUTPATIENT
Start: 2020-10-16 | End: 2020-11-09

## 2020-10-16 RX ORDER — ESOMEPRAZOLE MAGNESIUM 40 MG/1
CAPSULE, DELAYED RELEASE ORAL
Qty: 60 CAPSULE | Refills: 0 | Status: SHIPPED | OUTPATIENT
Start: 2020-10-16 | End: 2020-11-09

## 2020-10-19 ENCOUNTER — TELEPHONE (OUTPATIENT)
Dept: FAMILY MEDICINE CLINIC | Facility: CLINIC | Age: 60
End: 2020-10-19

## 2020-10-19 DIAGNOSIS — R79.89 ELEVATED TSH: Primary | ICD-10-CM

## 2020-10-19 DIAGNOSIS — M34.9 SCLERODERMA (HCC): ICD-10-CM

## 2020-10-19 NOTE — TELEPHONE ENCOUNTER
Pt requested thyroid labs. I pended the order for your approval she would also like for you to order any other labs relating to her scleroderma.  Please advise

## 2020-10-19 NOTE — TELEPHONE ENCOUNTER
Pt has upcoming blood work to complete and would like a thyroid added on as well as any test that could relate to her scleroderma condition. Please advise.

## 2020-11-04 ENCOUNTER — LAB ENCOUNTER (OUTPATIENT)
Dept: LAB | Age: 60
End: 2020-11-04
Attending: FAMILY MEDICINE
Payer: COMMERCIAL

## 2020-11-04 DIAGNOSIS — M34.9 SCLERODERMA (HCC): ICD-10-CM

## 2020-11-04 DIAGNOSIS — E78.00 PURE HYPERCHOLESTEROLEMIA: ICD-10-CM

## 2020-11-04 DIAGNOSIS — K21.00 REFLUX ESOPHAGITIS: ICD-10-CM

## 2020-11-04 DIAGNOSIS — R79.89 ELEVATED TSH: ICD-10-CM

## 2020-11-04 DIAGNOSIS — E55.9 VITAMIN D DEFICIENCY: ICD-10-CM

## 2020-11-04 PROCEDURE — 86038 ANTINUCLEAR ANTIBODIES: CPT | Performed by: FAMILY MEDICINE

## 2020-11-04 PROCEDURE — 82306 VITAMIN D 25 HYDROXY: CPT | Performed by: FAMILY MEDICINE

## 2020-11-04 PROCEDURE — 80050 GENERAL HEALTH PANEL: CPT | Performed by: FAMILY MEDICINE

## 2020-11-04 PROCEDURE — 36415 COLL VENOUS BLD VENIPUNCTURE: CPT | Performed by: FAMILY MEDICINE

## 2020-11-04 PROCEDURE — 80061 LIPID PANEL: CPT | Performed by: FAMILY MEDICINE

## 2020-11-04 PROCEDURE — 84439 ASSAY OF FREE THYROXINE: CPT | Performed by: FAMILY MEDICINE

## 2020-11-09 ENCOUNTER — OFFICE VISIT (OUTPATIENT)
Dept: FAMILY MEDICINE CLINIC | Facility: CLINIC | Age: 60
End: 2020-11-09
Payer: COMMERCIAL

## 2020-11-09 VITALS
HEART RATE: 72 BPM | TEMPERATURE: 97 F | BODY MASS INDEX: 29.68 KG/M2 | DIASTOLIC BLOOD PRESSURE: 86 MMHG | HEIGHT: 64.5 IN | RESPIRATION RATE: 16 BRPM | WEIGHT: 176 LBS | SYSTOLIC BLOOD PRESSURE: 124 MMHG

## 2020-11-09 DIAGNOSIS — I73.00 RAYNAUD'S DISEASE WITHOUT GANGRENE: ICD-10-CM

## 2020-11-09 DIAGNOSIS — E78.00 PURE HYPERCHOLESTEROLEMIA: ICD-10-CM

## 2020-11-09 DIAGNOSIS — M34.9 SCLERODERMA (HCC): ICD-10-CM

## 2020-11-09 DIAGNOSIS — K21.00 REFLUX ESOPHAGITIS: ICD-10-CM

## 2020-11-09 DIAGNOSIS — R60.0 BILATERAL EDEMA OF LOWER EXTREMITY: ICD-10-CM

## 2020-11-09 DIAGNOSIS — Z00.00 WELL WOMAN EXAM WITHOUT GYNECOLOGICAL EXAM: Primary | ICD-10-CM

## 2020-11-09 DIAGNOSIS — E55.9 VITAMIN D DEFICIENCY: ICD-10-CM

## 2020-11-09 PROCEDURE — 3008F BODY MASS INDEX DOCD: CPT | Performed by: FAMILY MEDICINE

## 2020-11-09 PROCEDURE — 3074F SYST BP LT 130 MM HG: CPT | Performed by: FAMILY MEDICINE

## 2020-11-09 PROCEDURE — 3079F DIAST BP 80-89 MM HG: CPT | Performed by: FAMILY MEDICINE

## 2020-11-09 PROCEDURE — 99072 ADDL SUPL MATRL&STAF TM PHE: CPT | Performed by: FAMILY MEDICINE

## 2020-11-09 PROCEDURE — 99396 PREV VISIT EST AGE 40-64: CPT | Performed by: FAMILY MEDICINE

## 2020-11-09 RX ORDER — HYDROCHLOROTHIAZIDE 25 MG/1
25 TABLET ORAL DAILY
Qty: 90 TABLET | Refills: 1 | Status: SHIPPED | OUTPATIENT
Start: 2020-11-09 | End: 2021-05-19

## 2020-11-09 RX ORDER — ESOMEPRAZOLE MAGNESIUM 40 MG/1
CAPSULE, DELAYED RELEASE ORAL
Qty: 60 CAPSULE | Refills: 1 | Status: SHIPPED | OUTPATIENT
Start: 2020-11-09 | End: 2021-05-19

## 2020-11-09 RX ORDER — ATORVASTATIN CALCIUM 40 MG/1
40 TABLET, FILM COATED ORAL NIGHTLY
Qty: 30 TABLET | Refills: 1 | Status: SHIPPED | OUTPATIENT
Start: 2020-11-09 | End: 2021-01-22

## 2020-11-10 ENCOUNTER — TELEPHONE (OUTPATIENT)
Dept: FAMILY MEDICINE CLINIC | Facility: CLINIC | Age: 60
End: 2020-11-10

## 2020-11-11 NOTE — TELEPHONE ENCOUNTER
Letter of approval received from [facility]. Granted 11/11/2020-11/11/2021. Approval letter sent to scan. Call to Lindsay Stover. Spoke to Amanda ''R'' . Advised esomeprazole was approved. [employer] voiced understanding.

## 2020-11-14 ENCOUNTER — MED REC SCAN ONLY (OUTPATIENT)
Dept: FAMILY MEDICINE CLINIC | Facility: CLINIC | Age: 60
End: 2020-11-14

## 2021-01-22 ENCOUNTER — TELEPHONE (OUTPATIENT)
Dept: FAMILY MEDICINE CLINIC | Facility: CLINIC | Age: 61
End: 2021-01-22

## 2021-01-22 DIAGNOSIS — E78.00 PURE HYPERCHOLESTEROLEMIA: ICD-10-CM

## 2021-01-22 RX ORDER — ATORVASTATIN CALCIUM 40 MG/1
40 TABLET, FILM COATED ORAL NIGHTLY
Qty: 30 TABLET | Refills: 3 | Status: SHIPPED | OUTPATIENT
Start: 2021-01-22 | End: 2021-05-20

## 2021-05-18 DIAGNOSIS — K21.00 REFLUX ESOPHAGITIS: ICD-10-CM

## 2021-05-18 DIAGNOSIS — R60.0 BILATERAL EDEMA OF LOWER EXTREMITY: ICD-10-CM

## 2021-05-18 NOTE — TELEPHONE ENCOUNTER
Request for refill of HCTZ and esomeprazole  Last OV 11/9/20 last fill date for both 11/9/20 hydrochlorothiazide #90 +1 Esomeprazole #60 + 1. Pt is due for a 6 month follow up.

## 2021-05-19 DIAGNOSIS — R60.0 BILATERAL EDEMA OF LOWER EXTREMITY: ICD-10-CM

## 2021-05-19 RX ORDER — HYDROCHLOROTHIAZIDE 25 MG/1
25 TABLET ORAL DAILY
Qty: 30 TABLET | Refills: 0 | Status: SHIPPED | OUTPATIENT
Start: 2021-05-19 | End: 2021-05-20

## 2021-05-19 RX ORDER — ESOMEPRAZOLE MAGNESIUM 40 MG/1
CAPSULE, DELAYED RELEASE ORAL
Qty: 60 CAPSULE | Refills: 0 | Status: SHIPPED | OUTPATIENT
Start: 2021-05-19 | End: 2021-06-21

## 2021-05-20 DIAGNOSIS — E78.00 PURE HYPERCHOLESTEROLEMIA: ICD-10-CM

## 2021-05-20 RX ORDER — HYDROCHLOROTHIAZIDE 25 MG/1
TABLET ORAL
Qty: 90 TABLET | Refills: 0 | Status: SHIPPED | OUTPATIENT
Start: 2021-05-20 | End: 2022-01-10

## 2021-05-20 RX ORDER — ATORVASTATIN CALCIUM 40 MG/1
40 TABLET, FILM COATED ORAL NIGHTLY
Qty: 30 TABLET | Refills: 3 | Status: SHIPPED | OUTPATIENT
Start: 2021-05-20

## 2021-06-21 DIAGNOSIS — K21.00 REFLUX ESOPHAGITIS: ICD-10-CM

## 2021-06-21 RX ORDER — ESOMEPRAZOLE MAGNESIUM 40 MG/1
CAPSULE, DELAYED RELEASE ORAL
Qty: 60 CAPSULE | Refills: 0 | Status: SHIPPED | OUTPATIENT
Start: 2021-06-21 | End: 2021-08-01

## 2021-07-30 DIAGNOSIS — K21.00 REFLUX ESOPHAGITIS: ICD-10-CM

## 2021-08-01 RX ORDER — ESOMEPRAZOLE MAGNESIUM 40 MG/1
CAPSULE, DELAYED RELEASE ORAL
Qty: 60 CAPSULE | Refills: 0 | Status: SHIPPED | OUTPATIENT
Start: 2021-08-01 | End: 2021-08-27

## 2021-08-05 ENCOUNTER — LAB ENCOUNTER (OUTPATIENT)
Dept: LAB | Age: 61
End: 2021-08-05
Attending: FAMILY MEDICINE
Payer: COMMERCIAL

## 2021-08-05 DIAGNOSIS — E78.00 PURE HYPERCHOLESTEROLEMIA: ICD-10-CM

## 2021-08-05 DIAGNOSIS — I73.00 RAYNAUD'S DISEASE WITHOUT GANGRENE: ICD-10-CM

## 2021-08-05 DIAGNOSIS — M34.9 SCLERODERMA (HCC): ICD-10-CM

## 2021-08-05 LAB
ALBUMIN SERPL-MCNC: 3.9 G/DL (ref 3.4–5)
ALBUMIN/GLOB SERPL: 1.2 {RATIO} (ref 1–2)
ALP LIVER SERPL-CCNC: 66 U/L
ALT SERPL-CCNC: 34 U/L
ANION GAP SERPL CALC-SCNC: 6 MMOL/L (ref 0–18)
AST SERPL-CCNC: 26 U/L (ref 15–37)
BILIRUB SERPL-MCNC: 0.7 MG/DL (ref 0.1–2)
BUN BLD-MCNC: 13 MG/DL (ref 7–18)
CALCIUM BLD-MCNC: 9 MG/DL (ref 8.5–10.1)
CHLORIDE SERPL-SCNC: 106 MMOL/L (ref 98–112)
CHOLEST SMN-MCNC: 190 MG/DL (ref ?–200)
CO2 SERPL-SCNC: 28 MMOL/L (ref 21–32)
CREAT BLD-MCNC: 0.63 MG/DL
GLOBULIN PLAS-MCNC: 3.3 G/DL (ref 2.8–4.4)
GLUCOSE BLD-MCNC: 91 MG/DL (ref 70–99)
HDLC SERPL-MCNC: 80 MG/DL (ref 40–59)
LDLC SERPL CALC-MCNC: 94 MG/DL (ref ?–100)
M PROTEIN MFR SERPL ELPH: 7.2 G/DL (ref 6.4–8.2)
NONHDLC SERPL-MCNC: 110 MG/DL (ref ?–130)
OSMOLALITY SERPL CALC.SUM OF ELEC: 290 MOSM/KG (ref 275–295)
PATIENT FASTING Y/N/NP: YES
PATIENT FASTING Y/N/NP: YES
POTASSIUM SERPL-SCNC: 3.6 MMOL/L (ref 3.5–5.1)
SODIUM SERPL-SCNC: 140 MMOL/L (ref 136–145)
TRIGL SERPL-MCNC: 90 MG/DL (ref 30–149)
VLDLC SERPL CALC-MCNC: 15 MG/DL (ref 0–30)

## 2021-08-05 PROCEDURE — 80061 LIPID PANEL: CPT | Performed by: FAMILY MEDICINE

## 2021-08-05 PROCEDURE — 80053 COMPREHEN METABOLIC PANEL: CPT | Performed by: FAMILY MEDICINE

## 2021-08-17 ENCOUNTER — OFFICE VISIT (OUTPATIENT)
Dept: FAMILY MEDICINE CLINIC | Facility: CLINIC | Age: 61
End: 2021-08-17
Payer: COMMERCIAL

## 2021-08-17 VITALS
HEART RATE: 84 BPM | SYSTOLIC BLOOD PRESSURE: 118 MMHG | WEIGHT: 177 LBS | BODY MASS INDEX: 29.85 KG/M2 | HEIGHT: 64.5 IN | DIASTOLIC BLOOD PRESSURE: 84 MMHG | RESPIRATION RATE: 16 BRPM | TEMPERATURE: 98 F

## 2021-08-17 DIAGNOSIS — M34.9 SCLERODERMA (HCC): ICD-10-CM

## 2021-08-17 DIAGNOSIS — E78.00 PURE HYPERCHOLESTEROLEMIA: Primary | ICD-10-CM

## 2021-08-17 DIAGNOSIS — E55.9 VITAMIN D DEFICIENCY: ICD-10-CM

## 2021-08-17 DIAGNOSIS — R60.0 BILATERAL EDEMA OF LOWER EXTREMITY: ICD-10-CM

## 2021-08-17 DIAGNOSIS — K21.00 GASTROESOPHAGEAL REFLUX DISEASE WITH ESOPHAGITIS WITHOUT HEMORRHAGE: ICD-10-CM

## 2021-08-17 PROCEDURE — 3008F BODY MASS INDEX DOCD: CPT | Performed by: FAMILY MEDICINE

## 2021-08-17 PROCEDURE — 99214 OFFICE O/P EST MOD 30 MIN: CPT | Performed by: FAMILY MEDICINE

## 2021-08-17 PROCEDURE — 3079F DIAST BP 80-89 MM HG: CPT | Performed by: FAMILY MEDICINE

## 2021-08-17 PROCEDURE — 3074F SYST BP LT 130 MM HG: CPT | Performed by: FAMILY MEDICINE

## 2021-08-17 NOTE — PROGRESS NOTES
HPI/Subjective:   Yaz Ortega is a 63year old female who presents for Hypercholesterolemia     Patient is here for follow-up of her scleroderma, GERD and hypercholesterolemia. She has been taking Nexium and doing well. She does follow-up with ARJUN Velasquez (80.3 kg)   LMP 01/30/2010   BMI 29.91 kg/m²  Estimated body mass index is 29.91 kg/m² as calculated from the following:    Height as of this encounter: 5' 4.5\" (1.638 m). Weight as of this encounter: 177 lb (80.3 kg).   General appearance: alert, appea

## 2021-08-27 DIAGNOSIS — K21.00 REFLUX ESOPHAGITIS: ICD-10-CM

## 2021-08-27 RX ORDER — ESOMEPRAZOLE MAGNESIUM 40 MG/1
CAPSULE, DELAYED RELEASE ORAL
Qty: 180 CAPSULE | Refills: 0 | Status: SHIPPED | OUTPATIENT
Start: 2021-08-27 | End: 2021-11-30

## 2021-11-30 DIAGNOSIS — K21.00 REFLUX ESOPHAGITIS: ICD-10-CM

## 2021-11-30 RX ORDER — ESOMEPRAZOLE MAGNESIUM 40 MG/1
CAPSULE, DELAYED RELEASE ORAL
Qty: 180 CAPSULE | Refills: 0 | Status: SHIPPED | OUTPATIENT
Start: 2021-11-30

## 2021-12-09 ENCOUNTER — TELEPHONE (OUTPATIENT)
Dept: FAMILY MEDICINE CLINIC | Facility: CLINIC | Age: 61
End: 2021-12-09

## 2021-12-09 NOTE — TELEPHONE ENCOUNTER
Given up to the patient. I would be happy to see her on the 20th or if she prefers she can see Dr. Jaylin Killian or Katheran Schirmer depending on who has openings.

## 2021-12-09 NOTE — TELEPHONE ENCOUNTER
Any weakness in her hand, arm or leg? No chest pain? Do I have a SDA or hospital follow-up slot we can use?

## 2021-12-09 NOTE — TELEPHONE ENCOUNTER
Pt called and stated that she's experiencing pain in her left hand and left foot. Pt also stated that it's painful to pick things up and stand on things. Pt will like an appt. First available appt is mid January.      Pt will like to know what she can

## 2021-12-10 NOTE — TELEPHONE ENCOUNTER
S/w Andrew Young. 's next avail is now 12/27/21, (SDA). Andrew Young sts she will be out of town on this date, and will not return until 12/31/21. Sts she does not want to see another provider, would like to wait for appt with .     Jake rogersul

## 2021-12-16 ENCOUNTER — PATIENT MESSAGE (OUTPATIENT)
Dept: FAMILY MEDICINE CLINIC | Facility: CLINIC | Age: 61
End: 2021-12-16

## 2021-12-16 NOTE — TELEPHONE ENCOUNTER
From: Juany Baires  To: Chel Garnica MD  Sent: 12/16/2021 9:26 AM CST  Subject: esommeprazole magnesium 40 mg DR Caps 180count    good morning Dr. Denver Risser,  I am trying to find out what the status of the form that was sent over from my pharmacy w

## 2022-01-09 DIAGNOSIS — R60.0 BILATERAL EDEMA OF LOWER EXTREMITY: ICD-10-CM

## 2022-01-10 RX ORDER — HYDROCHLOROTHIAZIDE 25 MG/1
TABLET ORAL
Qty: 90 TABLET | Refills: 0 | Status: SHIPPED | OUTPATIENT
Start: 2022-01-10 | End: 2022-05-10

## 2022-01-10 NOTE — TELEPHONE ENCOUNTER
Received a request from pt's insurance for a PA to be completed for pt's esomeprazole mag 40mg. Form that was supplied was completed and ov notes werre attached and faxed to plan. [employer] sent to pt with update.

## 2022-01-11 ENCOUNTER — MED REC SCAN ONLY (OUTPATIENT)
Dept: FAMILY MEDICINE CLINIC | Facility: CLINIC | Age: 62
End: 2022-01-11

## 2022-01-11 NOTE — TELEPHONE ENCOUNTER
Prior [facility] was granted for esomeprazole 40 mg from 12/17/2021-12/17/2022.

## 2022-01-19 ENCOUNTER — TELEPHONE (OUTPATIENT)
Dept: FAMILY MEDICINE CLINIC | Facility: CLINIC | Age: 62
End: 2022-01-19

## 2022-01-19 NOTE — TELEPHONE ENCOUNTER
A PA was completed and faxed to plan on 1/10/22 for pt's ESomeprazole Mag DR Capsules 40mg,  It was sent to [facility] Electronic PA Form for Prescription Benefits.   Called to [employer] spoke with Antonio Howe who stated that the medication went through t

## 2022-01-31 ENCOUNTER — HOSPITAL ENCOUNTER (OUTPATIENT)
Dept: GENERAL RADIOLOGY | Age: 62
Discharge: HOME | End: 2022-01-31
Attending: FAMILY MEDICINE
Payer: COMMERCIAL

## 2022-01-31 ENCOUNTER — OFFICE VISIT (OUTPATIENT)
Dept: FAMILY MEDICINE CLINIC | Facility: CLINIC | Age: 62
End: 2022-01-31
Payer: COMMERCIAL

## 2022-01-31 VITALS
TEMPERATURE: 99 F | HEART RATE: 80 BPM | WEIGHT: 178 LBS | DIASTOLIC BLOOD PRESSURE: 82 MMHG | HEIGHT: 64.25 IN | RESPIRATION RATE: 16 BRPM | SYSTOLIC BLOOD PRESSURE: 130 MMHG | BODY MASS INDEX: 30.39 KG/M2

## 2022-01-31 DIAGNOSIS — G89.29 CHRONIC LEFT SHOULDER PAIN: ICD-10-CM

## 2022-01-31 DIAGNOSIS — H93.8X3 SENSATION OF FULLNESS IN BOTH EARS: ICD-10-CM

## 2022-01-31 DIAGNOSIS — M79.645 PAIN OF LEFT THUMB: Primary | ICD-10-CM

## 2022-01-31 DIAGNOSIS — M79.672 CHRONIC PAIN OF LEFT HEEL: ICD-10-CM

## 2022-01-31 DIAGNOSIS — M25.512 CHRONIC LEFT SHOULDER PAIN: ICD-10-CM

## 2022-01-31 DIAGNOSIS — M79.645 PAIN OF LEFT THUMB: ICD-10-CM

## 2022-01-31 DIAGNOSIS — G89.29 CHRONIC PAIN OF LEFT HEEL: ICD-10-CM

## 2022-01-31 PROCEDURE — 73140 X-RAY EXAM OF FINGER(S): CPT | Performed by: FAMILY MEDICINE

## 2022-01-31 PROCEDURE — 3075F SYST BP GE 130 - 139MM HG: CPT | Performed by: FAMILY MEDICINE

## 2022-01-31 PROCEDURE — 3008F BODY MASS INDEX DOCD: CPT | Performed by: FAMILY MEDICINE

## 2022-01-31 PROCEDURE — 3079F DIAST BP 80-89 MM HG: CPT | Performed by: FAMILY MEDICINE

## 2022-01-31 PROCEDURE — 99214 OFFICE O/P EST MOD 30 MIN: CPT | Performed by: FAMILY MEDICINE

## 2022-01-31 PROCEDURE — 73030 X-RAY EXAM OF SHOULDER: CPT | Performed by: FAMILY MEDICINE

## 2022-01-31 PROCEDURE — 73650 X-RAY EXAM OF HEEL: CPT | Performed by: FAMILY MEDICINE

## 2022-01-31 RX ORDER — METHYLPREDNISOLONE 4 MG/1
TABLET ORAL
Qty: 21 EACH | Refills: 0 | Status: SHIPPED | OUTPATIENT
Start: 2022-01-31

## 2022-01-31 RX ORDER — MULTIVIT WITH MINERALS/LUTEIN
2000 TABLET ORAL DAILY
COMMUNITY

## 2022-03-22 ENCOUNTER — LAB ENCOUNTER (OUTPATIENT)
Dept: LAB | Age: 62
End: 2022-03-22
Attending: FAMILY MEDICINE
Payer: COMMERCIAL

## 2022-03-22 DIAGNOSIS — R60.0 BILATERAL EDEMA OF LOWER EXTREMITY: ICD-10-CM

## 2022-03-22 DIAGNOSIS — K21.00 GASTROESOPHAGEAL REFLUX DISEASE WITH ESOPHAGITIS WITHOUT HEMORRHAGE: ICD-10-CM

## 2022-03-22 DIAGNOSIS — E55.9 VITAMIN D DEFICIENCY: ICD-10-CM

## 2022-03-22 DIAGNOSIS — M34.9 SCLERODERMA (HCC): ICD-10-CM

## 2022-03-22 DIAGNOSIS — E78.00 PURE HYPERCHOLESTEROLEMIA: ICD-10-CM

## 2022-03-22 LAB
ALBUMIN SERPL-MCNC: 3.4 G/DL (ref 3.4–5)
ALBUMIN/GLOB SERPL: 1.3 {RATIO} (ref 1–2)
ALP LIVER SERPL-CCNC: 75 U/L
ALT SERPL-CCNC: 30 U/L
ANION GAP SERPL CALC-SCNC: 2 MMOL/L (ref 0–18)
AST SERPL-CCNC: 24 U/L (ref 15–37)
BASOPHILS # BLD AUTO: 0.06 X10(3) UL (ref 0–0.2)
BASOPHILS NFR BLD AUTO: 1 %
BILIRUB SERPL-MCNC: 0.5 MG/DL (ref 0.1–2)
BUN BLD-MCNC: 10 MG/DL (ref 7–18)
CALCIUM BLD-MCNC: 8.6 MG/DL (ref 8.5–10.1)
CHLORIDE SERPL-SCNC: 110 MMOL/L (ref 98–112)
CHOLEST SERPL-MCNC: 154 MG/DL (ref ?–200)
CO2 SERPL-SCNC: 31 MMOL/L (ref 21–32)
CREAT BLD-MCNC: 0.62 MG/DL
EOSINOPHIL # BLD AUTO: 0.17 X10(3) UL (ref 0–0.7)
EOSINOPHIL NFR BLD AUTO: 2.9 %
ERYTHROCYTE [DISTWIDTH] IN BLOOD BY AUTOMATED COUNT: 12.3 %
FASTING PATIENT LIPID ANSWER: YES
FASTING STATUS PATIENT QL REPORTED: YES
GLOBULIN PLAS-MCNC: 2.6 G/DL (ref 2.8–4.4)
GLUCOSE BLD-MCNC: 99 MG/DL (ref 70–99)
HCT VFR BLD AUTO: 38.1 %
HDLC SERPL-MCNC: 61 MG/DL (ref 40–59)
HGB BLD-MCNC: 12.4 G/DL
IMM GRANULOCYTES # BLD AUTO: 0.02 X10(3) UL (ref 0–1)
IMM GRANULOCYTES NFR BLD: 0.3 %
LDLC SERPL CALC-MCNC: 79 MG/DL (ref ?–100)
LYMPHOCYTES # BLD AUTO: 1.37 X10(3) UL (ref 1–4)
LYMPHOCYTES NFR BLD AUTO: 23.5 %
MCH RBC QN AUTO: 30.5 PG (ref 26–34)
MCHC RBC AUTO-ENTMCNC: 32.5 G/DL (ref 31–37)
MCV RBC AUTO: 93.6 FL
MONOCYTES # BLD AUTO: 0.42 X10(3) UL (ref 0.1–1)
MONOCYTES NFR BLD AUTO: 7.2 %
NEUTROPHILS # BLD AUTO: 3.8 X10 (3) UL (ref 1.5–7.7)
NEUTROPHILS # BLD AUTO: 3.8 X10(3) UL (ref 1.5–7.7)
NEUTROPHILS NFR BLD AUTO: 65.1 %
NONHDLC SERPL-MCNC: 93 MG/DL (ref ?–130)
OSMOLALITY SERPL CALC.SUM OF ELEC: 295 MOSM/KG (ref 275–295)
PLATELET # BLD AUTO: 183 10(3)UL (ref 150–450)
POTASSIUM SERPL-SCNC: 4.5 MMOL/L (ref 3.5–5.1)
PROT SERPL-MCNC: 6 G/DL (ref 6.4–8.2)
RBC # BLD AUTO: 4.07 X10(6)UL
SODIUM SERPL-SCNC: 143 MMOL/L (ref 136–145)
TRIGL SERPL-MCNC: 71 MG/DL (ref 30–149)
VIT D+METAB SERPL-MCNC: 67.8 NG/ML (ref 30–100)
VLDLC SERPL CALC-MCNC: 11 MG/DL (ref 0–30)
WBC # BLD AUTO: 5.8 X10(3) UL (ref 4–11)

## 2022-03-22 PROCEDURE — 80061 LIPID PANEL: CPT | Performed by: FAMILY MEDICINE

## 2022-03-22 PROCEDURE — 85025 COMPLETE CBC W/AUTO DIFF WBC: CPT | Performed by: FAMILY MEDICINE

## 2022-03-22 PROCEDURE — 80053 COMPREHEN METABOLIC PANEL: CPT | Performed by: FAMILY MEDICINE

## 2022-03-22 PROCEDURE — 82306 VITAMIN D 25 HYDROXY: CPT | Performed by: FAMILY MEDICINE

## 2022-03-22 PROCEDURE — 86038 ANTINUCLEAR ANTIBODIES: CPT | Performed by: FAMILY MEDICINE

## 2022-03-23 LAB — ANA SER QL: NEGATIVE

## 2022-05-10 ENCOUNTER — OFFICE VISIT (OUTPATIENT)
Dept: FAMILY MEDICINE CLINIC | Facility: CLINIC | Age: 62
End: 2022-05-10
Payer: COMMERCIAL

## 2022-05-10 VITALS
RESPIRATION RATE: 16 BRPM | TEMPERATURE: 98 F | HEIGHT: 64.5 IN | DIASTOLIC BLOOD PRESSURE: 84 MMHG | SYSTOLIC BLOOD PRESSURE: 134 MMHG | HEART RATE: 80 BPM | WEIGHT: 175 LBS | BODY MASS INDEX: 29.51 KG/M2

## 2022-05-10 DIAGNOSIS — R60.0 BILATERAL EDEMA OF LOWER EXTREMITY: ICD-10-CM

## 2022-05-10 DIAGNOSIS — M34.9 SCLERODERMA (HCC): Primary | ICD-10-CM

## 2022-05-10 DIAGNOSIS — K21.00 GASTROESOPHAGEAL REFLUX DISEASE WITH ESOPHAGITIS WITHOUT HEMORRHAGE: ICD-10-CM

## 2022-05-10 DIAGNOSIS — E78.00 PURE HYPERCHOLESTEROLEMIA: ICD-10-CM

## 2022-05-10 PROCEDURE — 3008F BODY MASS INDEX DOCD: CPT | Performed by: FAMILY MEDICINE

## 2022-05-10 PROCEDURE — 99214 OFFICE O/P EST MOD 30 MIN: CPT | Performed by: FAMILY MEDICINE

## 2022-05-10 PROCEDURE — 3079F DIAST BP 80-89 MM HG: CPT | Performed by: FAMILY MEDICINE

## 2022-05-10 PROCEDURE — 3075F SYST BP GE 130 - 139MM HG: CPT | Performed by: FAMILY MEDICINE

## 2022-05-10 RX ORDER — HYDROCHLOROTHIAZIDE 25 MG/1
25 TABLET ORAL
Qty: 180 TABLET | Refills: 1 | Status: SHIPPED | OUTPATIENT
Start: 2022-05-10

## 2022-05-10 RX ORDER — ATORVASTATIN CALCIUM 40 MG/1
40 TABLET, FILM COATED ORAL NIGHTLY
Qty: 90 TABLET | Refills: 1 | Status: SHIPPED | OUTPATIENT
Start: 2022-05-10

## 2022-06-20 ENCOUNTER — APPOINTMENT (OUTPATIENT)
Dept: CT IMAGING | Facility: HOSPITAL | Age: 62
End: 2022-06-20
Attending: PHYSICIAN ASSISTANT
Payer: COMMERCIAL

## 2022-06-20 ENCOUNTER — TELEPHONE (OUTPATIENT)
Dept: FAMILY MEDICINE CLINIC | Facility: CLINIC | Age: 62
End: 2022-06-20

## 2022-06-20 ENCOUNTER — APPOINTMENT (OUTPATIENT)
Dept: GENERAL RADIOLOGY | Facility: HOSPITAL | Age: 62
End: 2022-06-20
Attending: EMERGENCY MEDICINE
Payer: COMMERCIAL

## 2022-06-20 ENCOUNTER — HOSPITAL ENCOUNTER (EMERGENCY)
Facility: HOSPITAL | Age: 62
Discharge: HOME | End: 2022-06-20
Attending: EMERGENCY MEDICINE
Payer: COMMERCIAL

## 2022-06-20 VITALS
HEART RATE: 88 BPM | WEIGHT: 170 LBS | DIASTOLIC BLOOD PRESSURE: 84 MMHG | BODY MASS INDEX: 29.02 KG/M2 | SYSTOLIC BLOOD PRESSURE: 140 MMHG | HEIGHT: 64 IN | OXYGEN SATURATION: 98 % | RESPIRATION RATE: 12 BRPM | TEMPERATURE: 99 F

## 2022-06-20 DIAGNOSIS — U07.1 COVID-19: Primary | ICD-10-CM

## 2022-06-20 DIAGNOSIS — R60.0 LOWER EXTREMITY EDEMA: ICD-10-CM

## 2022-06-20 LAB
ALBUMIN SERPL-MCNC: 2.7 G/DL (ref 3.4–5)
ALBUMIN/GLOB SERPL: 0.9 {RATIO} (ref 1–2)
ALP LIVER SERPL-CCNC: 62 U/L
ALT SERPL-CCNC: 24 U/L
ANION GAP SERPL CALC-SCNC: 4 MMOL/L (ref 0–18)
AST SERPL-CCNC: 24 U/L (ref 15–37)
ATRIAL RATE: 106 BPM
BASOPHILS # BLD AUTO: 0.02 X10(3) UL (ref 0–0.2)
BASOPHILS NFR BLD AUTO: 0.4 %
BILIRUB SERPL-MCNC: 0.3 MG/DL (ref 0.1–2)
BUN BLD-MCNC: 6 MG/DL (ref 7–18)
CALCIUM BLD-MCNC: 8.5 MG/DL (ref 8.5–10.1)
CHLORIDE SERPL-SCNC: 107 MMOL/L (ref 98–112)
CO2 SERPL-SCNC: 29 MMOL/L (ref 21–32)
CREAT BLD-MCNC: 0.62 MG/DL
EOSINOPHIL # BLD AUTO: 0.02 X10(3) UL (ref 0–0.7)
EOSINOPHIL NFR BLD AUTO: 0.4 %
ERYTHROCYTE [DISTWIDTH] IN BLOOD BY AUTOMATED COUNT: 12.8 %
GLOBULIN PLAS-MCNC: 3 G/DL (ref 2.8–4.4)
GLUCOSE BLD-MCNC: 109 MG/DL (ref 70–99)
HCT VFR BLD AUTO: 49.1 %
HGB BLD-MCNC: 16 G/DL
IMM GRANULOCYTES # BLD AUTO: 0.02 X10(3) UL (ref 0–1)
IMM GRANULOCYTES NFR BLD: 0.4 %
LYMPHOCYTES # BLD AUTO: 1.27 X10(3) UL (ref 1–4)
LYMPHOCYTES NFR BLD AUTO: 24.7 %
MCH RBC QN AUTO: 30.4 PG (ref 26–34)
MCHC RBC AUTO-ENTMCNC: 32.6 G/DL (ref 31–37)
MCV RBC AUTO: 93.2 FL
MONOCYTES # BLD AUTO: 0.4 X10(3) UL (ref 0.1–1)
MONOCYTES NFR BLD AUTO: 7.8 %
NEUTROPHILS # BLD AUTO: 3.41 X10 (3) UL (ref 1.5–7.7)
NEUTROPHILS # BLD AUTO: 3.41 X10(3) UL (ref 1.5–7.7)
NEUTROPHILS NFR BLD AUTO: 66.3 %
NT-PROBNP SERPL-MCNC: 155 PG/ML (ref ?–125)
OSMOLALITY SERPL CALC.SUM OF ELEC: 288 MOSM/KG (ref 275–295)
P AXIS: 62 DEGREES
P-R INTERVAL: 138 MS
PLATELET # BLD AUTO: 142 10(3)UL (ref 150–450)
POTASSIUM SERPL-SCNC: 4.5 MMOL/L (ref 3.5–5.1)
PROT SERPL-MCNC: 5.7 G/DL (ref 6.4–8.2)
Q-T INTERVAL: 346 MS
QRS DURATION: 90 MS
QTC CALCULATION (BEZET): 459 MS
R AXIS: 24 DEGREES
RBC # BLD AUTO: 5.27 X10(6)UL
SARS-COV-2 RNA RESP QL NAA+PROBE: DETECTED
SODIUM SERPL-SCNC: 140 MMOL/L (ref 136–145)
T AXIS: 49 DEGREES
TROPONIN I HIGH SENSITIVITY: 10 NG/L
VENTRICULAR RATE: 106 BPM
WBC # BLD AUTO: 5.1 X10(3) UL (ref 4–11)

## 2022-06-20 PROCEDURE — 36415 COLL VENOUS BLD VENIPUNCTURE: CPT

## 2022-06-20 PROCEDURE — 80053 COMPREHEN METABOLIC PANEL: CPT | Performed by: EMERGENCY MEDICINE

## 2022-06-20 PROCEDURE — 84484 ASSAY OF TROPONIN QUANT: CPT | Performed by: EMERGENCY MEDICINE

## 2022-06-20 PROCEDURE — 71045 X-RAY EXAM CHEST 1 VIEW: CPT | Performed by: EMERGENCY MEDICINE

## 2022-06-20 PROCEDURE — 83880 ASSAY OF NATRIURETIC PEPTIDE: CPT | Performed by: EMERGENCY MEDICINE

## 2022-06-20 PROCEDURE — 99285 EMERGENCY DEPT VISIT HI MDM: CPT

## 2022-06-20 PROCEDURE — 71275 CT ANGIOGRAPHY CHEST: CPT | Performed by: PHYSICIAN ASSISTANT

## 2022-06-20 PROCEDURE — 85025 COMPLETE CBC W/AUTO DIFF WBC: CPT | Performed by: EMERGENCY MEDICINE

## 2022-06-20 PROCEDURE — 93005 ELECTROCARDIOGRAM TRACING: CPT

## 2022-06-20 PROCEDURE — 93010 ELECTROCARDIOGRAM REPORT: CPT

## 2022-06-20 NOTE — ED INITIAL ASSESSMENT (HPI)
Pt to the ER via walk in for swelling, SOB, and \"heavines in the arm sand legs from swelling. Pt also states her BP has been high. Pt states last Wednesday she had an episode had a BM and passed out x2 BP was 64/40. Pt presents a/ox4. Resp even and nonlabored.  Skin warm and dry

## 2022-06-20 NOTE — TELEPHONE ENCOUNTER
S/w pt. Reports having syncopal episode last week. Went to [facility] via ambulance. Dx: vasovagul. They ran labs, ekg--K+ was a low, they gave her supplement and said see pcp. Since then pt reports having heavy legs, tingling, balance has felt off. Swelling in her lower legs, Rt >L  Sob today. Reports having these sx's 2 yrs ago, went to [facility], admit  4 days and they didn't determine cause. Seeing you Thursday for follow up but asking what to do today. I advised with those new sxs she may need ER. She is asking your feedback. Please advise if she should go there?

## 2022-06-20 NOTE — TELEPHONE ENCOUNTER
Based on symptoms described by patient, it was determined that live call be transferred to a triage nurse for further evaluation. Pt was seen last week at [facility] for dizzy spells. This weekend and today, pt's legs feel heavy and tingly.  Pt's BP this morning at 7:30am 178/110

## 2022-06-20 NOTE — TELEPHONE ENCOUNTER
S/w pt. Advised to ER, explained rationale for this. She voiced understanding. Pt will keep appt here Thursday for follow up.

## 2022-06-20 NOTE — TELEPHONE ENCOUNTER
Spoke with Dr. Darnell Leon regarding below he advises ER evaluation today, may need further imaging

## 2022-06-21 ENCOUNTER — MED REC SCAN ONLY (OUTPATIENT)
Dept: FAMILY MEDICINE CLINIC | Facility: CLINIC | Age: 62
End: 2022-06-21

## 2022-06-22 ENCOUNTER — TELEPHONE (OUTPATIENT)
Dept: FAMILY MEDICINE CLINIC | Facility: CLINIC | Age: 62
End: 2022-06-22

## 2022-07-07 ENCOUNTER — OFFICE VISIT (OUTPATIENT)
Dept: FAMILY MEDICINE CLINIC | Facility: CLINIC | Age: 62
End: 2022-07-07
Payer: COMMERCIAL

## 2022-07-07 VITALS
RESPIRATION RATE: 20 BRPM | HEART RATE: 78 BPM | WEIGHT: 172 LBS | HEIGHT: 64 IN | BODY MASS INDEX: 29.37 KG/M2 | TEMPERATURE: 98 F

## 2022-07-07 DIAGNOSIS — U09.9 POST-COVID SYNDROME: ICD-10-CM

## 2022-07-07 DIAGNOSIS — R60.9 EDEMA, UNSPECIFIED TYPE: Primary | ICD-10-CM

## 2022-07-07 DIAGNOSIS — B35.1 ONYCHOMYCOSIS: ICD-10-CM

## 2022-07-07 PROCEDURE — 99214 OFFICE O/P EST MOD 30 MIN: CPT | Performed by: FAMILY MEDICINE

## 2022-07-07 PROCEDURE — 87102 FUNGUS ISOLATION CULTURE: CPT | Performed by: FAMILY MEDICINE

## 2022-07-07 PROCEDURE — 3008F BODY MASS INDEX DOCD: CPT | Performed by: FAMILY MEDICINE

## 2022-08-15 DIAGNOSIS — K21.00 REFLUX ESOPHAGITIS: ICD-10-CM

## 2022-08-15 RX ORDER — ESOMEPRAZOLE MAGNESIUM 40 MG/1
40 CAPSULE, DELAYED RELEASE ORAL 2 TIMES DAILY
Qty: 180 CAPSULE | Refills: 0 | Status: SHIPPED | OUTPATIENT
Start: 2022-08-15

## 2022-08-29 ENCOUNTER — HOSPITAL ENCOUNTER (OUTPATIENT)
Dept: CV DIAGNOSTICS | Facility: HOSPITAL | Age: 62
Discharge: HOME | End: 2022-08-29
Attending: FAMILY MEDICINE
Payer: COMMERCIAL

## 2022-08-29 DIAGNOSIS — R60.9 EDEMA, UNSPECIFIED TYPE: ICD-10-CM

## 2022-08-29 DIAGNOSIS — U09.9 POST-COVID SYNDROME: ICD-10-CM

## 2022-08-29 PROCEDURE — 93306 TTE W/DOPPLER COMPLETE: CPT | Performed by: FAMILY MEDICINE

## 2022-12-06 ENCOUNTER — TELEPHONE (OUTPATIENT)
Dept: FAMILY MEDICINE CLINIC | Facility: CLINIC | Age: 62
End: 2022-12-06

## 2022-12-06 NOTE — TELEPHONE ENCOUNTER
Pt called for a authorization from the Dr in order to refill her medication. Esomeprazole Magnesium 40 MG Oral Capsule Delayed Release    Pt stated that she usually needs a authorization for this medication before the year ends.      Please advise

## 2022-12-06 NOTE — TELEPHONE ENCOUNTER
LOV:5/10/22  NOV: due 12/22  Last refill: 8/15/22    [facility] set to patient to call to schedule an appointment.

## 2022-12-14 NOTE — TELEPHONE ENCOUNTER
Pt seeing Parallona Thomson tomorrow. She is not requesting refill. Sounds like a prior auth.    Once Rx is sent at appt, it will trigger pharm to send request.

## 2022-12-15 ENCOUNTER — TELEPHONE (OUTPATIENT)
Dept: FAMILY MEDICINE CLINIC | Facility: CLINIC | Age: 62
End: 2022-12-15

## 2022-12-15 ENCOUNTER — OFFICE VISIT (OUTPATIENT)
Dept: FAMILY MEDICINE CLINIC | Facility: CLINIC | Age: 62
End: 2022-12-15
Payer: COMMERCIAL

## 2022-12-15 VITALS
WEIGHT: 177.81 LBS | SYSTOLIC BLOOD PRESSURE: 130 MMHG | RESPIRATION RATE: 16 BRPM | BODY MASS INDEX: 30.35 KG/M2 | HEIGHT: 64 IN | DIASTOLIC BLOOD PRESSURE: 76 MMHG | HEART RATE: 72 BPM | TEMPERATURE: 97 F

## 2022-12-15 DIAGNOSIS — Z78.0 POST-MENOPAUSAL: ICD-10-CM

## 2022-12-15 DIAGNOSIS — M34.9 SCLERODERMA (HCC): ICD-10-CM

## 2022-12-15 DIAGNOSIS — E78.00 PURE HYPERCHOLESTEROLEMIA: Primary | ICD-10-CM

## 2022-12-15 DIAGNOSIS — E66.9 OBESITY (BMI 30.0-34.9): ICD-10-CM

## 2022-12-15 DIAGNOSIS — Z13.820 SCREENING FOR OSTEOPOROSIS: ICD-10-CM

## 2022-12-15 DIAGNOSIS — K21.00 REFLUX ESOPHAGITIS: ICD-10-CM

## 2022-12-15 DIAGNOSIS — R60.0 BILATERAL EDEMA OF LOWER EXTREMITY: ICD-10-CM

## 2022-12-15 DIAGNOSIS — E55.9 VITAMIN D DEFICIENCY: ICD-10-CM

## 2022-12-15 PROCEDURE — 3078F DIAST BP <80 MM HG: CPT | Performed by: NURSE PRACTITIONER

## 2022-12-15 PROCEDURE — 99214 OFFICE O/P EST MOD 30 MIN: CPT | Performed by: NURSE PRACTITIONER

## 2022-12-15 PROCEDURE — 3008F BODY MASS INDEX DOCD: CPT | Performed by: NURSE PRACTITIONER

## 2022-12-15 PROCEDURE — 3075F SYST BP GE 130 - 139MM HG: CPT | Performed by: NURSE PRACTITIONER

## 2022-12-15 RX ORDER — HYDROCHLOROTHIAZIDE 25 MG/1
25 TABLET ORAL
Qty: 180 TABLET | Refills: 1 | Status: SHIPPED | OUTPATIENT
Start: 2022-12-15

## 2022-12-15 RX ORDER — ESOMEPRAZOLE MAGNESIUM 40 MG/1
40 CAPSULE, DELAYED RELEASE ORAL 2 TIMES DAILY
Qty: 180 CAPSULE | Refills: 0 | Status: SHIPPED | OUTPATIENT
Start: 2022-12-15

## 2022-12-15 NOTE — TELEPHONE ENCOUNTER
Pt called earlier to verify secondary insurance. Tried uploading and verifying but there is data mismatch on the insurance part. Pt needs to call insurance and fix her  on secondary.     Please advise

## 2023-03-08 ENCOUNTER — LAB ENCOUNTER (OUTPATIENT)
Dept: LAB | Age: 63
End: 2023-03-08
Attending: NURSE PRACTITIONER
Payer: COMMERCIAL

## 2023-03-08 ENCOUNTER — APPOINTMENT (OUTPATIENT)
Dept: MAMMOGRAPHY | Age: 63
End: 2023-03-08
Attending: FAMILY MEDICINE
Payer: COMMERCIAL

## 2023-03-08 ENCOUNTER — HOSPITAL ENCOUNTER (OUTPATIENT)
Dept: BONE DENSITY | Age: 63
Discharge: HOME | End: 2023-03-08
Attending: NURSE PRACTITIONER
Payer: COMMERCIAL

## 2023-03-08 DIAGNOSIS — E78.00 PURE HYPERCHOLESTEROLEMIA: ICD-10-CM

## 2023-03-08 DIAGNOSIS — R60.0 BILATERAL EDEMA OF LOWER EXTREMITY: ICD-10-CM

## 2023-03-08 DIAGNOSIS — M34.9 SYSTEMIC SCLEROSIS (HCC): Primary | ICD-10-CM

## 2023-03-08 DIAGNOSIS — Z13.820 SCREENING FOR OSTEOPOROSIS: ICD-10-CM

## 2023-03-08 DIAGNOSIS — Z78.0 POST-MENOPAUSAL: ICD-10-CM

## 2023-03-08 DIAGNOSIS — M34.9 SCLERODERMA (HCC): ICD-10-CM

## 2023-03-08 DIAGNOSIS — E55.9 VITAMIN D DEFICIENCY: ICD-10-CM

## 2023-03-08 LAB
ALBUMIN SERPL-MCNC: 3.7 G/DL (ref 3.4–5)
ALBUMIN/GLOB SERPL: 1.2 {RATIO} (ref 1–2)
ALP LIVER SERPL-CCNC: 75 U/L
ALT SERPL-CCNC: 35 U/L
ANION GAP SERPL CALC-SCNC: 4 MMOL/L (ref 0–18)
AST SERPL-CCNC: 31 U/L (ref 15–37)
BASOPHILS # BLD AUTO: 0.05 X10(3) UL (ref 0–0.2)
BASOPHILS NFR BLD AUTO: 0.9 %
BILIRUB SERPL-MCNC: 0.5 MG/DL (ref 0.1–2)
BUN BLD-MCNC: 6 MG/DL (ref 7–18)
CALCIUM BLD-MCNC: 9.2 MG/DL (ref 8.5–10.1)
CHLORIDE SERPL-SCNC: 105 MMOL/L (ref 98–112)
CHOLEST SERPL-MCNC: 163 MG/DL (ref ?–200)
CO2 SERPL-SCNC: 31 MMOL/L (ref 21–32)
CREAT BLD-MCNC: 0.59 MG/DL
CRP SERPL-MCNC: <0.29 MG/DL (ref ?–0.3)
EOSINOPHIL # BLD AUTO: 0.15 X10(3) UL (ref 0–0.7)
EOSINOPHIL NFR BLD AUTO: 2.6 %
ERYTHROCYTE [DISTWIDTH] IN BLOOD BY AUTOMATED COUNT: 12.5 %
ERYTHROCYTE [SEDIMENTATION RATE] IN BLOOD: 12 MM/HR
FASTING PATIENT LIPID ANSWER: YES
FASTING STATUS PATIENT QL REPORTED: YES
GFR SERPLBLD BASED ON 1.73 SQ M-ARVRAT: 101 ML/MIN/1.73M2 (ref 60–?)
GLOBULIN PLAS-MCNC: 3 G/DL (ref 2.8–4.4)
GLUCOSE BLD-MCNC: 93 MG/DL (ref 70–99)
HCT VFR BLD AUTO: 41.2 %
HDLC SERPL-MCNC: 78 MG/DL (ref 40–59)
HGB BLD-MCNC: 13.5 G/DL
IMM GRANULOCYTES # BLD AUTO: 0.02 X10(3) UL (ref 0–1)
IMM GRANULOCYTES NFR BLD: 0.4 %
LDLC SERPL CALC-MCNC: 70 MG/DL (ref ?–100)
LYMPHOCYTES # BLD AUTO: 1.29 X10(3) UL (ref 1–4)
LYMPHOCYTES NFR BLD AUTO: 22.6 %
MCH RBC QN AUTO: 29.9 PG (ref 26–34)
MCHC RBC AUTO-ENTMCNC: 32.8 G/DL (ref 31–37)
MCV RBC AUTO: 91.4 FL
MONOCYTES # BLD AUTO: 0.54 X10(3) UL (ref 0.1–1)
MONOCYTES NFR BLD AUTO: 9.5 %
NEUTROPHILS # BLD AUTO: 3.66 X10 (3) UL (ref 1.5–7.7)
NEUTROPHILS # BLD AUTO: 3.66 X10(3) UL (ref 1.5–7.7)
NEUTROPHILS NFR BLD AUTO: 64 %
NONHDLC SERPL-MCNC: 85 MG/DL (ref ?–130)
OSMOLALITY SERPL CALC.SUM OF ELEC: 287 MOSM/KG (ref 275–295)
PLATELET # BLD AUTO: 199 10(3)UL (ref 150–450)
POTASSIUM SERPL-SCNC: 4.2 MMOL/L (ref 3.5–5.1)
PROT SERPL-MCNC: 6.7 G/DL (ref 6.4–8.2)
RBC # BLD AUTO: 4.51 X10(6)UL
RHEUMATOID FACT SERPL-ACNC: 22 IU/ML (ref ?–15)
SODIUM SERPL-SCNC: 140 MMOL/L (ref 136–145)
TRIGL SERPL-MCNC: 79 MG/DL (ref 30–149)
TSI SER-ACNC: 1.12 MIU/ML (ref 0.36–3.74)
URATE SERPL-MCNC: 5.6 MG/DL
VIT D+METAB SERPL-MCNC: 40.4 NG/ML (ref 30–100)
VLDLC SERPL CALC-MCNC: 12 MG/DL (ref 0–30)
WBC # BLD AUTO: 5.7 X10(3) UL (ref 4–11)

## 2023-03-08 PROCEDURE — 86140 C-REACTIVE PROTEIN: CPT

## 2023-03-08 PROCEDURE — 80053 COMPREHEN METABOLIC PANEL: CPT

## 2023-03-08 PROCEDURE — 86200 CCP ANTIBODY: CPT

## 2023-03-08 PROCEDURE — 86225 DNA ANTIBODY NATIVE: CPT

## 2023-03-08 PROCEDURE — 84550 ASSAY OF BLOOD/URIC ACID: CPT

## 2023-03-08 PROCEDURE — 85025 COMPLETE CBC W/AUTO DIFF WBC: CPT

## 2023-03-08 PROCEDURE — 85652 RBC SED RATE AUTOMATED: CPT

## 2023-03-08 PROCEDURE — 77080 DXA BONE DENSITY AXIAL: CPT | Performed by: NURSE PRACTITIONER

## 2023-03-08 PROCEDURE — 86038 ANTINUCLEAR ANTIBODIES: CPT

## 2023-03-08 PROCEDURE — 82306 VITAMIN D 25 HYDROXY: CPT

## 2023-03-08 PROCEDURE — 84443 ASSAY THYROID STIM HORMONE: CPT

## 2023-03-08 PROCEDURE — 80061 LIPID PANEL: CPT

## 2023-03-08 PROCEDURE — 86431 RHEUMATOID FACTOR QUANT: CPT

## 2023-03-10 LAB
CCP IGG SERPL-ACNC: 1.2 U/ML (ref 0–6.9)
DSDNA IGG SERPL IA-ACNC: 1.5 IU/ML
ENA AB SER QL IA: 0.1 UG/L
ENA AB SER QL IA: NEGATIVE

## 2023-03-15 DIAGNOSIS — K21.00 REFLUX ESOPHAGITIS: ICD-10-CM

## 2023-03-15 RX ORDER — ESOMEPRAZOLE MAGNESIUM 40 MG/1
CAPSULE, DELAYED RELEASE ORAL
Qty: 180 CAPSULE | Refills: 0 | Status: SHIPPED | OUTPATIENT
Start: 2023-03-15

## 2023-03-15 NOTE — TELEPHONE ENCOUNTER
Not per protocol  Last office visit 12/15/22  Last refill was: 7/15/22, 180 caps  Next appointment: asked to return in 6 months    Please sign pended medication if appropriate

## 2023-03-27 ENCOUNTER — OFFICE VISIT (OUTPATIENT)
Dept: FAMILY MEDICINE CLINIC | Facility: CLINIC | Age: 63
End: 2023-03-27
Payer: COMMERCIAL

## 2023-03-27 VITALS
HEIGHT: 64 IN | DIASTOLIC BLOOD PRESSURE: 78 MMHG | BODY MASS INDEX: 29.81 KG/M2 | HEART RATE: 68 BPM | SYSTOLIC BLOOD PRESSURE: 118 MMHG | TEMPERATURE: 97 F | WEIGHT: 174.63 LBS | RESPIRATION RATE: 16 BRPM

## 2023-03-27 DIAGNOSIS — R76.8 RHEUMATOID FACTOR POSITIVE: ICD-10-CM

## 2023-03-27 DIAGNOSIS — M34.9 SCLERODERMA (HCC): ICD-10-CM

## 2023-03-27 DIAGNOSIS — R91.1 PULMONARY NODULE: ICD-10-CM

## 2023-03-27 DIAGNOSIS — K21.00 GASTROESOPHAGEAL REFLUX DISEASE WITH ESOPHAGITIS WITHOUT HEMORRHAGE: ICD-10-CM

## 2023-03-27 DIAGNOSIS — E78.00 PURE HYPERCHOLESTEROLEMIA: ICD-10-CM

## 2023-03-27 DIAGNOSIS — Z00.00 ANNUAL PHYSICAL EXAM: Primary | ICD-10-CM

## 2023-03-27 PROCEDURE — 3078F DIAST BP <80 MM HG: CPT | Performed by: NURSE PRACTITIONER

## 2023-03-27 PROCEDURE — 3008F BODY MASS INDEX DOCD: CPT | Performed by: NURSE PRACTITIONER

## 2023-03-27 PROCEDURE — 99396 PREV VISIT EST AGE 40-64: CPT | Performed by: NURSE PRACTITIONER

## 2023-03-27 PROCEDURE — 3074F SYST BP LT 130 MM HG: CPT | Performed by: NURSE PRACTITIONER

## 2023-03-28 ENCOUNTER — TELEPHONE (OUTPATIENT)
Dept: FAMILY MEDICINE CLINIC | Facility: CLINIC | Age: 63
End: 2023-03-28

## 2023-03-28 DIAGNOSIS — M34.9 SCLERODERMA (HCC): Primary | ICD-10-CM

## 2023-03-28 NOTE — TELEPHONE ENCOUNTER
Spoke with Dr. Aruna Mace office to inquire if any of the providers specialize in scleroderma and was told that they all do. Referral for Dr. Ketty Davis pended.

## 2023-04-04 DIAGNOSIS — E04.1 THYROID NODULE: Primary | ICD-10-CM

## 2023-04-10 ENCOUNTER — TELEPHONE (OUTPATIENT)
Dept: FAMILY MEDICINE CLINIC | Facility: CLINIC | Age: 63
End: 2023-04-10

## 2023-04-10 NOTE — TELEPHONE ENCOUNTER
Received fax informing [facility] that patients CT scan of chest with contrast has been approved by insurance.

## 2023-04-17 ENCOUNTER — HOSPITAL ENCOUNTER (OUTPATIENT)
Dept: ULTRASOUND IMAGING | Age: 63
Discharge: HOME | End: 2023-04-17
Attending: NURSE PRACTITIONER
Payer: COMMERCIAL

## 2023-04-17 DIAGNOSIS — E04.1 THYROID NODULE: ICD-10-CM

## 2023-04-17 PROCEDURE — 76536 US EXAM OF HEAD AND NECK: CPT | Performed by: NURSE PRACTITIONER

## 2023-06-13 DIAGNOSIS — R60.0 BILATERAL EDEMA OF LOWER EXTREMITY: ICD-10-CM

## 2023-06-13 RX ORDER — HYDROCHLOROTHIAZIDE 25 MG/1
TABLET ORAL
Qty: 180 TABLET | Refills: 1 | Status: SHIPPED | OUTPATIENT
Start: 2023-06-13

## 2023-06-15 DIAGNOSIS — K21.00 REFLUX ESOPHAGITIS: ICD-10-CM

## 2023-06-15 RX ORDER — ESOMEPRAZOLE MAGNESIUM 40 MG/1
40 CAPSULE, DELAYED RELEASE ORAL 2 TIMES DAILY
Qty: 180 CAPSULE | Refills: 0 | Status: SHIPPED | OUTPATIENT
Start: 2023-06-15

## 2023-06-15 NOTE — TELEPHONE ENCOUNTER
LOV: 03/27/2023    Last Refill:   Medication Quantity Refills Start End   ESOMEPRAZOLE MAGNESIUM 40 MG Oral Capsule Delayed Release 180 capsule 0 3/15/2023      RTC: 09/27/2023    Protocol: n/a     Refill pended. Please approve if okay. Thank you.

## 2023-09-19 ENCOUNTER — OFFICE VISIT (OUTPATIENT)
Dept: RHEUMATOLOGY | Facility: CLINIC | Age: 63
End: 2023-09-19
Payer: COMMERCIAL

## 2023-09-19 VITALS
SYSTOLIC BLOOD PRESSURE: 162 MMHG | RESPIRATION RATE: 16 BRPM | HEIGHT: 64 IN | TEMPERATURE: 99 F | WEIGHT: 178 LBS | BODY MASS INDEX: 30.39 KG/M2 | HEART RATE: 76 BPM | DIASTOLIC BLOOD PRESSURE: 84 MMHG

## 2023-09-19 DIAGNOSIS — I73.01 RAYNAUD'S DISEASE WITH GANGRENE (HCC): ICD-10-CM

## 2023-09-19 DIAGNOSIS — L94.3 SCLERODACTYLY: ICD-10-CM

## 2023-09-19 DIAGNOSIS — R76.8 RHEUMATOID FACTOR POSITIVE: ICD-10-CM

## 2023-09-19 DIAGNOSIS — G89.29 CHRONIC PAIN OF RIGHT KNEE: ICD-10-CM

## 2023-09-19 DIAGNOSIS — M34.9 DIFFUSE SCLERODERMA (HCC): Primary | ICD-10-CM

## 2023-09-19 DIAGNOSIS — M25.561 CHRONIC PAIN OF RIGHT KNEE: ICD-10-CM

## 2023-09-19 DIAGNOSIS — R06.00 DYSPNEA, UNSPECIFIED TYPE: ICD-10-CM

## 2023-09-19 PROCEDURE — 99244 OFF/OP CNSLTJ NEW/EST MOD 40: CPT | Performed by: INTERNAL MEDICINE

## 2023-09-19 PROCEDURE — 3077F SYST BP >= 140 MM HG: CPT | Performed by: INTERNAL MEDICINE

## 2023-09-19 PROCEDURE — 3008F BODY MASS INDEX DOCD: CPT | Performed by: INTERNAL MEDICINE

## 2023-09-19 PROCEDURE — 3079F DIAST BP 80-89 MM HG: CPT | Performed by: INTERNAL MEDICINE

## 2023-09-20 NOTE — PROGRESS NOTES
Phoned [employer] PA is not Required for PFT( cpt 34586 and 58502)   PA is required for Echo (cpt 87865) through [employer].   [phone]

## 2023-09-21 NOTE — PROGRESS NOTES
Phoned [employer] to initiate Pa Echo. PA approved. Effective 9/21/23-11/19/23. [id number]. Phoned pt and notified of approval, and instructions to call CS.

## 2023-09-27 ENCOUNTER — RT VISIT (OUTPATIENT)
Dept: RESPIRATORY THERAPY | Facility: HOSPITAL | Age: 63
End: 2023-09-27
Attending: INTERNAL MEDICINE
Payer: COMMERCIAL

## 2023-09-27 DIAGNOSIS — M34.9 DIFFUSE SCLERODERMA (HCC): ICD-10-CM

## 2023-09-27 DIAGNOSIS — R06.00 DYSPNEA, UNSPECIFIED TYPE: ICD-10-CM

## 2023-09-27 PROCEDURE — 94010 BREATHING CAPACITY TEST: CPT | Performed by: INTERNAL MEDICINE

## 2023-09-27 PROCEDURE — 94726 PLETHYSMOGRAPHY LUNG VOLUMES: CPT | Performed by: INTERNAL MEDICINE

## 2023-09-27 PROCEDURE — 94729 DIFFUSING CAPACITY: CPT | Performed by: INTERNAL MEDICINE

## 2023-09-28 LAB
C-REACTIVE PROTEIN: 3.2 MG/L
RHEUMATOID FACTOR: <14 IU/ML
SED RATE BY MODIFIED$WESTERGREN: 6 MM/H

## 2023-10-03 NOTE — PROCEDURES
Pulmonary Function Test:   Findings:  Spirometry: FEV1 is 2.18 L, 92% predicted. FVC is 2.73 L, 90% predicted and FEV1/ FVC ratio is 0.80  The flow-volume loop demonstrates a normal pattern. MVV is 78, 87%predicted  Lung Volumes: The TLC is 5.35 L, 108% predicted. The residual volume 2.62 L, 133% predicted. Diffusion Capacity:  The diffusion capacity is 16.04 or 81% predicted and 93% predicted when corrected for alveolar volume. Impression:  There is no airway obstruction on spirometry and visualized on flow-volume loop. Lung volumes show normal range total lung capacity. Although residual volume is increased. Despite the absence of airway obstruction, there is evidence of air trapping (residual volume of 2.62 L, 133% predicted). Diffusion capacity is normal range. when considering alveolar volume. There are no previous pulmonary function tests available for comparison.        Navid Cruz MD

## 2023-10-25 ENCOUNTER — HOSPITAL ENCOUNTER (OUTPATIENT)
Dept: CV DIAGNOSTICS | Facility: HOSPITAL | Age: 63
Discharge: HOME | End: 2023-10-25
Attending: INTERNAL MEDICINE

## 2023-10-25 DIAGNOSIS — M34.9 DIFFUSE SCLERODERMA (HCC): ICD-10-CM

## 2023-10-25 DIAGNOSIS — R06.00 DYSPNEA, UNSPECIFIED TYPE: ICD-10-CM

## 2023-10-25 PROCEDURE — 93306 TTE W/DOPPLER COMPLETE: CPT | Performed by: INTERNAL MEDICINE

## 2023-11-02 ENCOUNTER — HOSPITAL ENCOUNTER (OUTPATIENT)
Dept: GENERAL RADIOLOGY | Age: 63
Discharge: HOME | End: 2023-11-02
Attending: NURSE PRACTITIONER
Payer: COMMERCIAL

## 2023-11-02 ENCOUNTER — OFFICE VISIT (OUTPATIENT)
Dept: FAMILY MEDICINE CLINIC | Facility: CLINIC | Age: 63
End: 2023-11-02
Payer: COMMERCIAL

## 2023-11-02 VITALS
RESPIRATION RATE: 16 BRPM | TEMPERATURE: 98 F | HEIGHT: 64 IN | DIASTOLIC BLOOD PRESSURE: 82 MMHG | BODY MASS INDEX: 31.24 KG/M2 | WEIGHT: 183 LBS | HEART RATE: 68 BPM | SYSTOLIC BLOOD PRESSURE: 126 MMHG

## 2023-11-02 DIAGNOSIS — M79.644 PAIN IN FINGER OF RIGHT HAND: Primary | ICD-10-CM

## 2023-11-02 DIAGNOSIS — M79.89 SWELLING OF RIGHT RING FINGER: ICD-10-CM

## 2023-11-02 DIAGNOSIS — M79.644 PAIN IN FINGER OF RIGHT HAND: ICD-10-CM

## 2023-11-02 PROCEDURE — 3008F BODY MASS INDEX DOCD: CPT | Performed by: NURSE PRACTITIONER

## 2023-11-02 PROCEDURE — 3079F DIAST BP 80-89 MM HG: CPT | Performed by: NURSE PRACTITIONER

## 2023-11-02 PROCEDURE — 73130 X-RAY EXAM OF HAND: CPT | Performed by: NURSE PRACTITIONER

## 2023-11-02 PROCEDURE — 99214 OFFICE O/P EST MOD 30 MIN: CPT | Performed by: NURSE PRACTITIONER

## 2023-11-02 PROCEDURE — 3074F SYST BP LT 130 MM HG: CPT | Performed by: NURSE PRACTITIONER

## 2023-11-02 RX ORDER — DICLOFENAC SODIUM 75 MG/1
75 TABLET, DELAYED RELEASE ORAL 2 TIMES DAILY
Qty: 20 TABLET | Refills: 0 | Status: SHIPPED | OUTPATIENT
Start: 2023-11-02

## 2024-01-18 ENCOUNTER — OFFICE VISIT (OUTPATIENT)
Dept: SURGERY | Facility: CLINIC | Age: 64
End: 2024-01-18
Payer: COMMERCIAL

## 2024-01-18 VITALS
OXYGEN SATURATION: 99 % | SYSTOLIC BLOOD PRESSURE: 126 MMHG | HEIGHT: 64 IN | BODY MASS INDEX: 30.22 KG/M2 | HEART RATE: 86 BPM | WEIGHT: 177 LBS | DIASTOLIC BLOOD PRESSURE: 78 MMHG

## 2024-01-18 DIAGNOSIS — M54.50 LUMBAR BACK PAIN: ICD-10-CM

## 2024-01-18 DIAGNOSIS — Z98.1 S/P LUMBAR FUSION: Primary | ICD-10-CM

## 2024-01-18 PROCEDURE — 99213 OFFICE O/P EST LOW 20 MIN: CPT | Performed by: NURSE PRACTITIONER

## 2024-01-18 PROCEDURE — 3074F SYST BP LT 130 MM HG: CPT | Performed by: NURSE PRACTITIONER

## 2024-01-18 PROCEDURE — 3078F DIAST BP <80 MM HG: CPT | Performed by: NURSE PRACTITIONER

## 2024-01-18 PROCEDURE — 3008F BODY MASS INDEX DOCD: CPT | Performed by: NURSE PRACTITIONER

## 2024-01-18 RX ORDER — CYCLOBENZAPRINE HCL 5 MG
5 TABLET ORAL 3 TIMES DAILY PRN
Qty: 30 TABLET | Refills: 0 | Status: SHIPPED | OUTPATIENT
Start: 2024-01-18

## 2024-01-18 NOTE — PATIENT INSTRUCTIONS
Refill policies:    Allow 2-3 business days for refills; controlled substances may take longer.  Contact your pharmacy at least 5 days prior to running out of medication and have them send an electronic request or submit request through the “request refill” option in your [employer] account.  Refills are not addressed on weekends; covering physicians do not authorize routine medications on weekends.  No narcotics or controlled substances are refilled after noon on Fridays or by on call physicians.  By law, narcotics must be electronically prescribed.  A 30 day supply with no refills is the maximum allowed.  If your prescription is due for a refill, you may be due for a follow up appointment.  To best provide you care, patients receiving routine medications need to be seen at least once a year.  Patients receiving narcotic/controlled substance medications need to be seen at least once every 3 months.  In the event that your preferred pharmacy does not have the requested medication in stock (e.g. Backordered), it is your responsibility to find another pharmacy that has the requested medication available.  We will gladly send a new prescription to that pharmacy at your request.    Scheduling Tests:    If your physician has ordered radiology tests such as MRI or CT scans, please contact Central Scheduling at [phone] right away to schedule the test.  Once scheduled, the [facility] Centralized Referral Team will work with your insurance carrier to obtain pre-certification or prior authorization.  Depending on your insurance carrier, approval may take 3-10 days.  It is highly recommended patients assure they have received an authorization before having a test performed.  If test is done without insurance authorization, patient may be responsible for the entire amount billed.      Precertification and Prior Authorizations:  If your physician has recommended that you have a procedure or additional testing performed the [facility]  Centralized Referral Team will contact your insurance carrier to obtain pre-certification or prior authorization.    You are strongly encouraged to contact your insurance carrier to verify that your procedure/test has been approved and is a COVERED benefit.  Although the [facility] Centralized Referral Team does its due diligence, the insurance carrier gives the disclaimer that \"Although the procedure is authorized, this does not guarantee payment.\"    Ultimately the patient is responsible for payment.   Thank you for your understanding in this matter.  Paperwork Completion:  If you require FMLA or disability paperwork for your recovery, please make sure to either drop it off or have it faxed to our office at [phone]. Be sure the form has your name and date of birth on it.  The form will be faxed to our Forms Department and they will complete it for you.  There is a 25$ fee for all forms that need to be filled out.  Please be aware there is a 10-14 day turnaround time.  You will need to sign a release of information (LISA) form if your paperwork does not come with one.  You may call the Forms Department with any questions at [phone].  Their fax number is [phone].

## 2024-01-18 NOTE — H&P
[facility]   Outpatient  Neurological Surgery Consult      Brandy Hope  : 1960  PCP: Corey Gonzalez MD  Referring Provider: No ref. provider found    REASON FOR CONSULTATION:       Chief Complaint   Patient presents with    New Patient    Low Back Pain         HISTORY OF PRESENT ILLNESS:       Brandy Hope is a 63 year old female [facility]  Patient is a previous patient of Dr. Palacio and is s/p L3/4 fusion in .  Patient states preop she had significant pain into bilateral legs with tingling.  Patient states since surgery this has resolved.  Patient states she has midline low back pain with occasional pain to the left lower back.  She states this has been for some time however it is worsened in the last 6 months.  Patient denies any paresthesias or weakness.  She denies current leg complaints.  Patient is taking OTC patient states this helps.  She currently rates her pain as 4 out of 10 pain.  At its worst it is a 9 out of 10.  Pt states her pain is worsened with slow ambulation.  Pt denies signs of neurogenic claudication.  Pt has not had spinal imaging since 2016.  She has not tried PT recently but has in the past      PAST MEDICAL HISTORY:  Past Medical History:   Diagnosis Date    Anemia, unspecified     Back problem     Chest pain, unspecified     Cough     Degeneration of lumbar or lumbosacral intervertebral disc     Degeneration of meniscus of right knee     Dysphagia, unspecified(787.20)     Esophageal reflux     Glucose intolerance (pre-diabetes)     High cholesterol     Lumbago     Neoplasm of uncertain behavior of skin     OTHER DISEASES     scleroderma    Other malaise and fatigue     Pure hypercholesterolemia     Scleroderma (HCC)     Visual impairment     glasses       PAST SURGICAL HISTORY:  Past Surgical History:   Procedure Laterality Date    BACK SURGERY      laminectomy ,     BACK SURGERY  2015    Lumbar  Dr. Pia delarosa          x4    COLONOSCOPY  06    COLONOSCOPY N/A 3/13/2016    Procedure: COLONOSCOPY;  Surgeon: Di Blackwell DO;  Location:  ENDOSCOPY    DEXA,BONE DENSITY,AXIAL SKELETON  2003    ENDOMETRIAL ABLATION      HYSTERECTOMY  05/03/10    TVH, Rt. salpingectomy    HYSTEROSCOPY,WITH ENDOMETRIAL  09    Performed by FABRIZIO SIM at [facility]    KNEE REPLACEMENT SURGERY Right 3/7/2016    OTHER SURGICAL HISTORY  06    esophagogastroduodenoscopy    OTHER SURGICAL HISTORY Left 2014    toe surgery-bunionectomy, fusion of toe       FAMILY HISTORY:  family history includes CABG in her father; Cancer in her father; Diabetes in her father; Heart Attack in her mother; Heart Disease in her father and mother.    SOCIAL HISTORY:   reports that she quit smoking about 43 years ago. Her smoking use included cigarettes. She has never used smokeless tobacco. She reports current alcohol use. She reports that she does not use drugs.    ALLERGIES:  Allergies   Allergen Reactions    Norco [Hydrocodone-Acetaminophen] NAUSEA AND VOMITING    Procardia [Nifedipine]      headache       MEDICATIONS:  Current Outpatient Medications   Medication Sig Dispense Refill    diclofenac 75 MG Oral Tab EC Take 1 tablet (75 mg total) by mouth 2 (two) times daily. 20 tablet 0    Esomeprazole Magnesium 40 MG Oral Capsule Delayed Release Take 1 capsule (40 mg total) by mouth 2 (two) times daily. 180 capsule 0    HYDROCHLOROTHIAZIDE 25 MG Oral Tab TAKE 1 TABLET(25 MG) BY MOUTH TWICE DAILY FOR DIURETIC 180 tablet 1    Zinc 50 MG Oral Cap Take by mouth.      atorvastatin 40 MG Oral Tab Take 1 tablet (40 mg total) by mouth nightly. 90 tablet 1    Ascorbic Acid (VITAMIN C) 1000 MG Oral Tab Take 2 tablets (2,000 mg total) by mouth daily.      triamcinolone acetonide 0.1 % External Ointment Apply 1 Application topically 2 (two) times daily. 80 g 1    Vitamin D3, Cholecalciferol, 125 MCG (5000  UT) Oral Cap Take 2 capsules (10,000 Units total) by mouth daily.      Hyaluronic Acid-Vitamin C (HYALURONIC ACID OR) Take 100 mg by mouth.      Omega-3 Fatty Acids (FISH OIL) 1200 MG Oral Capsule Delayed Release Take by mouth.      Aloe Oral Liquid Take by mouth. 4 ounces daily      Multiple Vitamins-Minerals (MULTIVITAMIN OR) Take  by mouth daily.           REVIEW OF SYSTEMS:       A 10-point system was reviewed.  Pertinent positives and negatives are noted in HPI.      PHYSICAL EXAMINATION:  VITAL SIGNS: /78   Pulse 86   Ht 64\"   Wt 177 lb (80.3 kg)   LMP 01/30/2010   SpO2 99%   BMI 30.38 kg/m²   GENERAL:  Patient is a 63 year old female in no apparent distress.  HEENT:  Normocephalic, atraumatic  NEUROLOGICAL:  This patient is alert and orientated x 3.  Speech fluent. Able to follow simple commands.   Pupils equally round and reactive to light.  3+ brisk bilaterally.  EOMs intact.  Visual fields are full.  Face is symmetrical. Tongue is midline.  Uvula and palate elevate symmetrically.  Shrug shoulders normally bilaterally.  The rest of the cranial nerves are grossly intact.  SPINE:  Gait/Coordination: Intact, non-antalgic gait. Able to toe and heel balance without difficulty.  Sensation: Sensory deficits noted on bilateral lower extremities to light touch: None   Palpation: + tenderness to palpation of midline lumbar spine   Palpation Right   (POS or NEG) Left   (POS or NEG)   Paraspinal Lumbar Muscles Pos Pos   Sacroiliac Joint Region Neg Neg     Upper extremity strength:       Deltoid    Triceps     Biceps        Wrist Extension  Finger extension Thumb Abduction  Thumb adduction Intrinsics   Right         5        5         5          5 5 5 5 5 5   Left         5        5         5          5 5 5 5 5 5     Lower extremity strength:     Iliopsoas  Hamstrings   Quads    D-flexion P-flexion Eversion   Right       5         5       5         5 5 5   Left       5         5       5         5 5 5      Reflexes DTRs:     Biceps   Brachioradialis   Triceps    Patellar    Ankle   Right      2+           2+       2+        2+       2+   Left      2+           2+       2+        2+       2+       DIAGNOSTIC DATA:     None    IMAGING:     Lumbar XR from 2016 reviewed, shows L3-4 fusion without hardware malfunction    ASSESSMENT:     S/p lumbar fusion  Lumbar back pain    PLAN:  Reviewed previous xrays with patient  -MRI lumbar spine ordered   -XR lumbar flex ex ordered to r/o adjacent segment disease  2.   Flexeril rx sent to pharmacy to try   3.   Discussed that pt may need to complete PT prior to insurance approval of MRI but given previous surgery and ongoing pain, will try to complete this imaging  4.   F/u after imaging    Total time spent 30 minutes.   % of time spent educating: Greater than 50%.  Nature of education / counseling: Pathology, imaging and treatment options discussed.    BECKY Santana  1/18/2024, 2:41 PM

## 2024-01-18 NOTE — PROGRESS NOTES
Patient is here today with c/o localized low back pain, greater on left side  Patient has hx of POSTERIOR LUMBAR LAMINECT SPINAL FUS W/INSTR 1 LEV  on 9/30/2015        Pain Scale:  4/10    Treatments:  Advil with good relief    Imaging:  no recent imaging

## 2024-02-12 ENCOUNTER — LAB ENCOUNTER (OUTPATIENT)
Dept: LAB | Facility: HOSPITAL | Age: 64
End: 2024-02-12
Attending: NURSE PRACTITIONER
Payer: COMMERCIAL

## 2024-02-12 ENCOUNTER — HOSPITAL ENCOUNTER (OUTPATIENT)
Dept: GENERAL RADIOLOGY | Facility: HOSPITAL | Age: 64
Discharge: HOME | End: 2024-02-12
Attending: NURSE PRACTITIONER
Payer: COMMERCIAL

## 2024-02-12 DIAGNOSIS — M34.9 DIFFUSE SCLERODERMA (HCC): ICD-10-CM

## 2024-02-12 DIAGNOSIS — M54.50 LUMBAR BACK PAIN: ICD-10-CM

## 2024-02-12 DIAGNOSIS — M25.561 CHRONIC PAIN OF RIGHT KNEE: ICD-10-CM

## 2024-02-12 DIAGNOSIS — G89.29 CHRONIC PAIN OF RIGHT KNEE: ICD-10-CM

## 2024-02-12 DIAGNOSIS — Z98.1 S/P LUMBAR FUSION: ICD-10-CM

## 2024-02-12 PROCEDURE — 72114 X-RAY EXAM L-S SPINE BENDING: CPT | Performed by: NURSE PRACTITIONER

## 2024-02-12 PROCEDURE — 86235 NUCLEAR ANTIGEN ANTIBODY: CPT

## 2024-02-12 PROCEDURE — 84182 PROTEIN WESTERN BLOT TEST: CPT

## 2024-02-12 PROCEDURE — 36415 COLL VENOUS BLD VENIPUNCTURE: CPT

## 2024-03-20 ENCOUNTER — OFFICE VISIT (OUTPATIENT)
Dept: RHEUMATOLOGY | Facility: CLINIC | Age: 64
End: 2024-03-20
Payer: COMMERCIAL

## 2024-03-20 VITALS
DIASTOLIC BLOOD PRESSURE: 78 MMHG | BODY MASS INDEX: 30.36 KG/M2 | WEIGHT: 180 LBS | TEMPERATURE: 98 F | SYSTOLIC BLOOD PRESSURE: 142 MMHG | RESPIRATION RATE: 16 BRPM | HEART RATE: 76 BPM | HEIGHT: 64.5 IN

## 2024-03-20 DIAGNOSIS — I73.01 RAYNAUD'S DISEASE WITH GANGRENE (HCC): ICD-10-CM

## 2024-03-20 DIAGNOSIS — R06.00 DYSPNEA, UNSPECIFIED TYPE: ICD-10-CM

## 2024-03-20 DIAGNOSIS — L94.3 SCLERODACTYLY: ICD-10-CM

## 2024-03-20 DIAGNOSIS — M72.0 CONTRACTURE, PALMAR FASCIA: ICD-10-CM

## 2024-03-20 DIAGNOSIS — M34.9 DIFFUSE SCLERODERMA (HCC): Primary | ICD-10-CM

## 2024-03-20 DIAGNOSIS — M51.36 DDD (DEGENERATIVE DISC DISEASE), LUMBAR: ICD-10-CM

## 2024-03-20 PROCEDURE — 3078F DIAST BP <80 MM HG: CPT | Performed by: INTERNAL MEDICINE

## 2024-03-20 PROCEDURE — 3077F SYST BP >= 140 MM HG: CPT | Performed by: INTERNAL MEDICINE

## 2024-03-20 PROCEDURE — 3008F BODY MASS INDEX DOCD: CPT | Performed by: INTERNAL MEDICINE

## 2024-03-20 PROCEDURE — 99215 OFFICE O/P EST HI 40 MIN: CPT | Performed by: INTERNAL MEDICINE

## 2024-03-20 NOTE — PROGRESS NOTES
RHEUMATOLOGY FOLLOW UP   Date of visit: 03/20/2024  ?  Chief Complaint   Patient presents with    CREST Scleroderma     6 month f/u. Hasn't felt too bad. Still having joint pain and swelling. No sores that wont heal on fingers. No GI issues. No shortness of breath. No new symptoms.        ASSESSMENT, DISCUSSION & PLAN   Assessment:  1. Diffuse scleroderma (HCC)    2. Sclerodactyly    3. Raynaud's disease with gangrene (HCC)    4. Contracture, palmar fascia    5. DDD (degenerative disc disease), lumbar    6. Dyspnea, unspecified type        Discussion:  Ms. Brandy Hope is a 62 yo woman with long standing hx of diffuse scleroderma, initially diagnosed in 1999. At that time, she had skin tightening diffusely, polyarthralgia, myalgias as well as shortness of breath and raynauds. She initially took methotrexate but unclear details and thinks only took for 6 weeks. She was then placed on combination of plaquenil and minocycline with significant improvement of symptoms. She eventually weaned off medications without any worsened symptoms. She also took nifedipine for the raynauds. She does have hx of digital ulcers with residual digital pit seen over left index finger. She still has signs of raynauds. She also has signs of skin tightening over the fingers diffusely from tips to PIPs as well as her face particularly perioral. She has telangectasia, reflux and well as joint pain in her right knee which has been replaced.   At this time, she lacks signs of synovitis and pt is hesitant to take medication if can be avoided.  Recommended we monitor her for worsened symptoms.  Will get updated PFTs and monitor annually   Reviewed CT of lungs which are stable and without signs of active ILD but has multiple nodules. Will repeat at the 18m thais.   Last ECHO was in 2023 so will repeat to monitor for signs of pulm HTN which were not present on last ECHO.   Previously ncouraged pt to consider restarting nifedipine for the  raynauds.   Also strongly recommended dermatology evaluation as well as wearing proper sun protection due to the appearance of significant sun damage and crepe skin.     She had RF positivity through her PCP, unclear significance as she lacks signs of active RA on her exam. Repeat RF and inflammatory markers were negative/normal.  Scleroderma ab panel showed positive RP11/155 which can be associated with rapid skin fibrosis, renal crisis, GAVE and pts commonly found with cancer at time of dx.   [employer] message sent with this information and article. Will monitor pt closely for above.     Of note, she does have continued low back pain. Expresses frustration with office since results not given. Encouraged to follow back with them and consider MRI d/t xray findings.     Also has early dupuytren's on right hand- will send ortho hand.     Okay for pt to follow up in 6 months or sooner as needed. If still doing well, will consider annual follow up, likely in winter months to monitor her raynaud's more closely.     Patient verbalized understanding of above instructions. No further questions at this time.    Code selection for this visit was based on time spent (50min) on date of service in preparing to see the patient, obtaining and/or reviewing separately obtained history, performing a medically appropriate examination, counseling and educating the patient/family/caregiver, ordering medications or testing, referring and communicating with other healthcare providers, documenting clinical information in the E HR, independently interpreting results and communicating results to the patient/family/caregiver and care coordination with the patient's other providers.    Additional time spent reviewing images with pt, discussing test results and gathering information and sending [email or web address] message.     ?  Plan:  Diagnoses and all orders for this visit:    Diffuse scleroderma (HCC)  -     CBC With Differential With Platelet; Future  -      Comp Metabolic Panel (14); Future  -     UA/M With Culture Reflex [E]; Future  -     Complete PFT; Future  -     CARD ECHO 2D DOPPLER (CPT=93306); Future  -     CT CHEST HI RESOLUTION (CPT=71250); Future    Sclerodactyly  -     CBC With Differential With Platelet; Future  -     Comp Metabolic Panel (14); Future  -     UA/M With Culture Reflex [E]; Future  -     Complete PFT; Future  -     CARD ECHO 2D DOPPLER (CPT=93306); Future  -     CT CHEST HI RESOLUTION (CPT=71250); Future    Raynaud's disease with gangrene (HCC)  -     CBC With Differential With Platelet; Future  -     Comp Metabolic Panel (14); Future  -     UA/M With Culture Reflex [E]; Future  -     Complete PFT; Future  -     CARD ECHO 2D DOPPLER (CPT=93306); Future  -     CT CHEST HI RESOLUTION (CPT=71250); Future    Contracture, palmar fascia    DDD (degenerative disc disease), lumbar    Dyspnea, unspecified type  -     Complete PFT; Future  -     CARD ECHO 2D DOPPLER (CPT=93306); Future  -     CT CHEST HI RESOLUTION (CPT=71250); Future          Return in about 6 months (around 9/20/2024).  ?  HPI   Brandy Hope is a 63 year old female with the following active problems who is referred for rheumatologic evaluation due to scleroderma who resents for follow up.     Raynauds stable-- possible one ulcer this winter but was mild winter.  Feels the skin tightening is stable but has some flexion deformity over the right palm.   + telangectasia over the arms  + possible calficiations under feet.   + reflux, takes esomeprazole in the mornings, tries to avoid taking at night   + occasional difficulty swallowing- stable. Depends on food like gummy.     Expresses frustration with not getting results from providers but being able to see on mychart.   Was not able to get MRI done due to insurance and not sure if she should still get  Gets back pain. Able to walk but hard to walk slowly. Denies pain down the legs unless laying on the left side can get numbness in  the left leg.        HPI from initial consultation  referred for rheumatologic evaluation due to scleroderma.     In 1999, noted some swelling and darker skin as well as some shortness of breath. Eventually developed myalgias and pain diffusely.  Dx with diffuse scleroderma, started on methotrexate, was frustrated with provider, took about 6 weeks and did not improvement of symptoms and stopped. No side effects.   Transferred care to different [facility] and recommended [facility]. Was seen at [place] in 1999, started on combination of plaquenil and minocycline. States it helped significantly with all her symptoms, felt near baseline by 6 months  Eventually weaned off all her medications for scleroderma, over 10 years ago, no major flare since the initial attack. Was on combination of therapies for over 10 years and tolerated.     + skin tightening over the fingers and over face/mouth (smaller aperture) (previously diffusely)  + raynaud's and hx of digital ulcers. Was previously on nifedipine, no longer taking.   + reflux, takes esomeprazole   + occasional difficulty swallowing  + hx of pulmonary nodules that have been stable (stable CT in April 2023)   Denies hx of pulmonary hypertension, last ECHO in Aug 2022.   + telangectasia over the arms    Can get some joint pain in the knees- hx of replacement on the right, worsened the past few days. Does follow with orthopedics for the left knee, had cortisone injection and considering another.   Get significant swelling with heat exposure.   Hx of plantar heel spur and knots under left foot   Hx of bunionectomy on left foot and fusion that still causes some pain.     + some hair thinning, not noticed by . Denies bald spots.   Admits to not wearing sun block regularly except on her face. Never saw dermatology.   + nail changes   + gum disease and hx of cavities     Hx of anemia requiring blood transfusion at time of knee replacement   Hx of borderline thrombocytopenia    Hx of EGD/cscope, normal as far as pt knows.     No history of significant morning stiffness.  Denies skin calcifications  The patient denies oral or nasal ulcers, photosensitive rash, elevated or scarring rashes, prior hematologic abnormality, prior renal or liver disease.  Denies hx of pericarditis or pleuritis.   No history of prior blood clot in the legs or lungs, strokes or ischemic phenomenon.  The patient denies any history of uveitis, crampy abdominal pain, constipation, diarrhea, bloody stools, nodular painful shin bruises, Achilles heel pain.   There are no symptoms of severe dry eyes, dry mouth, or swelling of the cheeks or under the jawbone.   No fevers, chills, lymphadenopathy, night sweats, unexpected weight loss.  Denies chronic sinus infections/disease or epistaxis.  Denies chronic cough or hemoptysis.         Past Medical History:  Past Medical History:   Diagnosis Date    Anemia, unspecified     Back problem     Chest pain, unspecified     Cough     Degeneration of lumbar or lumbosacral intervertebral disc     Degeneration of meniscus of right knee     Dysphagia, unspecified(787.20)     Esophageal reflux     Glucose intolerance (pre-diabetes)     High cholesterol     Lumbago     Neoplasm of uncertain behavior of skin     OTHER DISEASES     scleroderma    Other malaise and fatigue     Pure hypercholesterolemia     Scleroderma (HCC)     Visual impairment     glasses     Past Surgical History:  Past Surgical History:   Procedure Laterality Date    BACK SURGERY      laminectomy ,     BACK SURGERY  2015    Lumbar fusion, Dr. Palacio          x4    COLONOSCOPY  06    COLONOSCOPY N/A 3/13/2016    Procedure: COLONOSCOPY;  Surgeon: Di Blackwell DO;  Location:  ENDOSCOPY    DEXA,BONE DENSITY,AXIAL SKELETON  2003    ENDOMETRIAL ABLATION      HYSTERECTOMY  05/03/10    TV, Rt. salpingectomy    HYSTEROSCOPY,WITH ENDOMETRIAL  09    Performed by FABRIZIO SIM at [facility], [facility]    KNEE REPLACEMENT SURGERY Right 3/7/2016    OTHER SURGICAL HISTORY  06    esophagogastroduodenoscopy    OTHER SURGICAL HISTORY Left 2014    toe surgery-bunionectomy, fusion of toe     Family History:  Family History   Problem Relation Age of Onset    Heart Disease Father     Diabetes Father     Other (CABG [Other]) Father     Cancer Father         Lung    Heart Disease Mother     Heart Attack Mother         AMI     Social History:  Social History     Socioeconomic History    Marital status:    Tobacco Use    Smoking status: Former     Packs/day: 0     Types: Cigarettes     Quit date: 1980     Years since quittin.7    Smokeless tobacco: Never   Vaping Use    Vaping Use: Never used   Substance and Sexual Activity    Alcohol use: Yes     Alcohol/week: 0.0 standard drinks of alcohol     Comment: 1 a week     Drug use: No    Sexual activity: Yes     Partners: Male   Other Topics Concern    Caffeine Concern Yes     Comment: 1-3 c daily     Exercise No     Medications:  Outpatient Medications Marked as Taking for the 3/20/24 encounter (Office Visit) with Iza June DO   Medication Sig Dispense Refill    cyclobenzaprine 5 MG Oral Tab Take 1 tablet (5 mg total) by mouth 3 (three) times daily as needed for Muscle spasms. 30 tablet 0    diclofenac 75 MG Oral Tab EC Take 1 tablet (75 mg total) by mouth 2 (two) times daily. 20 tablet 0    HYDROCHLOROTHIAZIDE 25 MG Oral Tab TAKE 1 TABLET(25 MG) BY MOUTH TWICE DAILY FOR DIURETIC 180 tablet 1    Zinc 50 MG Oral Cap Take by mouth.      atorvastatin 40 MG Oral Tab Take 1 tablet (40 mg total) by mouth nightly. 90 tablet 1    Ascorbic Acid (VITAMIN C) 1000 MG Oral Tab Take 2 tablets (2,000 mg total) by mouth daily.      triamcinolone acetonide 0.1 % External Ointment Apply 1 Application topically 2 (two) times daily. 80 g 1    Vitamin D3, Cholecalciferol, 125 MCG (5000 UT) Oral Cap Take 2 capsules (10,000 Units total) by mouth daily.       Omega-3 Fatty Acids (FISH OIL) 1200 MG Oral Capsule Delayed Release Take by mouth.      Aloe Oral Liquid Take by mouth. 4 ounces daily      Multiple Vitamins-Minerals (MULTIVITAMIN OR) Take  by mouth daily.       Modified Medications    No medications on file     There are no discontinued medications.  ?  Allergies:  Allergies   Allergen Reactions    Norco [Hydrocodone-Acetaminophen] NAUSEA AND VOMITING    Procardia [Nifedipine]      headache     ?  REVIEW OF SYSTEMS   ?  Review of Systems   Constitutional:  Positive for malaise/fatigue. Negative for chills, fever and weight loss.   HENT:  Positive for hearing loss. Negative for nosebleeds and tinnitus.    Eyes:  Positive for blurred vision (intermittently; occasional floaters). Negative for pain and redness.   Respiratory:  Negative for cough, hemoptysis and shortness of breath.    Cardiovascular:  Positive for leg swelling (stable, worsens as day progress). Negative for chest pain and palpitations.   Gastrointestinal:  Negative for abdominal pain, blood in stool, constipation, diarrhea, heartburn (controlled) and nausea.   Genitourinary:  Negative for dysuria, frequency, hematuria and urgency.   Musculoskeletal:  Positive for back pain and joint pain. Negative for myalgias and neck pain.   Skin:  Negative for itching and rash.   Neurological:  Positive for tingling (with raynauds) and weakness (generalized). Negative for dizziness, seizures and headaches.   Endo/Heme/Allergies:  Negative for environmental allergies. Bruises/bleeds easily (not as bad as previously).   Psychiatric/Behavioral:  Negative for depression. The patient is not nervous/anxious and does not have insomnia.      PHYSICAL EXAM   Today's Vitals:  Temperature Blood Pressure Heart Rate Resp Rate SpO2   Temp: 98 °F (36.7 °C) BP: 142/78 Pulse: 76 Resp: 16     ?  Current Weight Height BMI BSA Pain   Wt Readings from Last 1 Encounters:   03/20/24 180 lb (81.6 kg)    Height: 5' 4.5\" (163.8 cm) Body  mass index is 30.42 kg/m². Body surface area is 1.88 meters squared.         Physical Exam  Vitals and nursing note reviewed.   Constitutional:       General: She is not in acute distress.     Appearance: She is well-developed. She is not diaphoretic.   HENT:      Head: Normocephalic.   Eyes:      General: No scleral icterus.     Extraocular Movements: Extraocular movements intact.      Conjunctiva/sclera: Conjunctivae normal.   Neck:      Vascular: No JVD.      Trachea: No tracheal deviation.   Cardiovascular:      Rate and Rhythm: Normal rate and regular rhythm.      Heart sounds: Normal heart sounds. No murmur heard.  Pulmonary:      Effort: Pulmonary effort is normal. No respiratory distress.      Breath sounds: Normal breath sounds. No wheezing.   Musculoskeletal:         General: Deformity present. No tenderness.      Cervical back: Normal range of motion and neck supple.      Comments: Chronic flexion deformity over left middle finger and right pinky finger.   Bony enlargement without tenderness over left middle PIP   No swelling, tenderness, redness or restriction of motion of the DIPs, MCPs, wrists, elbows, ankles, or joints of the feet.  Bilateral shoulders with full ROM, no evidence of impingement with provocative maneuvers.  Right knee post surgical, no obvious effusion, redness or warmth. Left knee with crepitus.   Right nodule palpated over palmar tendon forming early duputryen contracture    Lymphadenopathy:      Cervical: No cervical adenopathy.   Skin:     General: Skin is warm and dry.      Findings: No rash.      Comments: Diffuse skin tightening over fingers from tips to PIPs   Diffuse periungal erythema and drop off sign noted  Scarring/digital pit over index finger from prior ulcer  Some skin tightening over face, mouth aperture of just less than 3 of pt's fingers   Scattered telangectasia over hands and face  Skin with salt/pepper/sun damage appearance and crepe skin diffusely (except finger  tips)  Some tightening at tips of toes on right    Neurological:      Mental Status: She is alert and oriented to person, place, and time.      Cranial Nerves: No cranial nerve deficit.      Gait: Gait normal.   Psychiatric:         Mood and Affect: Mood normal.         Behavior: Behavior normal.       ?  Radiology review:      Narrative   PROCEDURE:  XR LUMBAR SPINE COMPLETE W/FLEX + EXT (CPT=72114)     TECHNIQUE:  AP, lateral, obliques, coned down view, and flexion/extension views of the spine were obtained.     COMPARISON:  [facility] , XR, XR LUMBAR SPINE (MIN 2 VIEWS) (CPT=72100), 9/28/2016, 6:06 PM.     INDICATIONS:  M54.50 Lumbar back pain Z98.1 S/P lumbar fusion     PATIENT STATED HISTORY: (As transcribed by Technologist)  Patient had a lumbar fusion in 2015. For about 1 year she has been having lower back pain and tightness.         FINDINGS:    Posterior rods and inter pedicular screws are noted L3-4.  There is approximately 1.6 cm anterior subluxation of L4 on L5 which is unchanged from previous exam.  Severe disc space narrowing is seen at L4-5 and L5-S1.  The metallic orthopedic   hardware appears intact.  There is no new fracture or dislocation.  There is anterior endplate spurring noted at L4-5 and L5-S1.  There is facet arthropathy again noted throughout the lumbar spine which does not appear significantly changed.     The osseous structures appear demineralized.  There is joint space narrowing and some spurring involving the SI joints bilaterally without ankylosis.     Atherosclerotic arterial vascular calcifications are seen in the aorta and aortic branches.       Impression   CONCLUSION:  No significant interval change.  Please see above for details.        LOCATION:  [facility]        Dictated by (CST): Karri Cook MD on 2/12/2024 at 6:28 PM      Finalized by (CST): Karri Cook MD on 2/12/2024 at 6:31 PM       Exam: CT CHEST W CONTRAST   CPT Code(s): 23769,-093,-463 - CT THORAX  W/DYE,Omnipaque 300 100mL vial,Omnipaque 350 100 mL vial     INDICATION:  Lung nodule (<= 8 mm) seen on outside chest radiograph. No current chest complaints.     COMPARISON:  6/20/2022 chest CTA study. 6/20/2022 chest radiograph.     TECHNIQUE: Routine CT exam of the thorax performed with 80 cc of intravenous Omnipaque 300.  Sagittal and coronal reconstructed images.  This study was performed on a Siemens Somatom Go CT scanner with both CARE Dose 4D tube modulation and SAFIRE   iterative reconstruction for radiation dose reduction.     FINDINGS:   CHEST: There is a small 5 mm hypodense nodule within the right thyroid lobe (series 2, image 3). There is no evidence of mediastinal, hilar, or axillary lymphadenopathy. The central airways are widely patent.  The thoracic aorta is normal in caliber   with mild atherosclerotic vascular calcifications. Heart size is within normal limits.     There is no evidence of pleural fluid or pleural thickening extends. Prior granulomatous disease is again identified with small calcified granulomata within the lungs bilaterally, unchanged. A small 5 mm noncalcified right upper lobe nodule is identified    in the upper hilar region series 4, image 27 and series 10, which 50). This is not significant changed.  There is stable mild subpleural nodularity along the lateral aspect of the right upper lobe and the posterolateral aspect of the right lower lobe.   This likely represents postinflammatory changes. The lungs are otherwise clear with no focal airspace consolidation.     UPPER ABDOMINAL STRUCTURES: There is a stable 1.7 cm water density simple cyst within the left lateral hepatic segment (series 2, images 5-90). The partially visualized upper abdominal structures are otherwise unremarkable. Mild thoracic spondylosis is   identified with a mild thoracic dextrocurvature. The visualized thoracic bone structures are otherwise unremarkable.     IMPRESSION:   1. Redemonstrated prior  granulomatous disease. Subpleural nodularity along the lateral aspect of the right upper lobe and in the posterolateral aspect of the right lower lobe are nonspecific, likely representing postoperative changes. This is not   significantly changed.   2. Stable 5 mm noncalcified nodule within the right upper lobe adjacent to the upper right hilum. An additional 12 month follow-up study should be considered to confirm ongoing stability.   3. No evidence of intrathoracic lymphadenopathy.   4. Small subcentimeter hypodense nodule within the right thyroid lobe. No specific imaging follow-up is recommended for a small nodule of this size. (Recommendations based on Managing Incidental Thyroid Nodules Detected on Imaging: White Paper of the ACR    Incidental Thyroid Findings Committee. JACR 2015;12:317079.)     Interpreting Radiologist:     Maximiliano Moseley M.D.   Electronically Signed: 04/04/2023 09:09 AM     Labs:  Lab Results   Component Value Date    WBC 5.7 03/08/2023    RBC 4.51 03/08/2023    HGB 13.5 03/08/2023    HCT 41.2 03/08/2023    .0 03/08/2023    MPV 9.4 12/13/2012    MCV 91.4 03/08/2023    MCH 29.9 03/08/2023    MCHC 32.8 03/08/2023    RDW 12.5 03/08/2023    NEPRELIM 3.66 03/08/2023    NEPERCENT 64.0 03/08/2023    LYPERCENT 22.6 03/08/2023    MOPERCENT 9.5 03/08/2023    EOPERCENT 2.6 03/08/2023    BAPERCENT 0.9 03/08/2023    NE 3.66 03/08/2023    LYMABS 1.29 03/08/2023    MOABSO 0.54 03/08/2023    EOABSO 0.15 03/08/2023    BAABSO 0.05 03/08/2023     Lab Results   Component Value Date    GLU 93 03/08/2023    BUN 6 (L) 03/08/2023    BUNCREA 10.9 11/04/2020    CREATSERUM 0.59 03/08/2023    ANIONGAP 4 03/08/2023     03/31/2017    GFRNAA 97 06/20/2022    GFRAA 112 06/20/2022    CA 9.2 03/08/2023    OSMOCALC 287 03/08/2023    ALKPHO 75 03/08/2023    AST 31 03/08/2023    ALT 35 03/08/2023    BILT 0.5 03/08/2023    TP 6.7 03/08/2023    ALB 3.7 03/08/2023    GLOBULIN 3.0 03/08/2023    AGRATIO 1.9  06/07/2011     03/08/2023    K 4.2 03/08/2023     03/08/2023    CO2 31.0 03/08/2023       Additional Labs:  10/2023  Conclusions:   1. Left ventricle: The cavity size was normal. Wall thickness was normal.      Systolic function was normal. The estimated ejection fraction was 60-65%.      No diagnostic evidence for regional wall motion abnormalities. Left      ventricular diastolic function parameters were normal for the patient's      age.   2. Left atrium: The left atrial volume was upper normal.   3. Mitral valve: There was mild regurgitation.   4. Right ventricle: The cavity size was normal. Systolic function was      normal.   5. Pulmonary arteries: The main pulmonary artery was dilated. Systolic      pressure was within the normal range, estimated to be 27mm Hg. Estimated      pulmonary artery diastolic pressure was 8mm Hg.   Impressions:  This study is compared with previous dated 08/29/2022: Similar   findings.     09/2023  Pulmonary Function Test:     Findings:  Spirometry: FEV1 is 2.18 L, 92% predicted. FVC is 2.73 L, 90% predicted and FEV1/ FVC ratio is 0.80  The flow-volume loop demonstrates a normal pattern. MVV is 78, 87%predicted  Lung Volumes:                                                                                                              The TLC is 5.35 L, 108% predicted.  The residual volume 2.62 L, 133% predicted.  Diffusion Capacity:  The diffusion capacity is 16.04 or 81% predicted and 93% predicted when corrected for alveolar volume.     Impression:  There is no airway obstruction on spirometry and visualized on flow-volume loop.    Lung volumes show normal range total lung capacity.  Although residual volume is increased.  Despite the absence of airway obstruction, there is evidence of air trapping (residual volume of 2.62 L, 133% predicted).    Diffusion capacity is normal range.  when considering alveolar volume.   There are no previous pulmonary function tests  available for comparison.         Jhonny Sales MD          08/2022  Conclusions:   1. Left ventricle: The cavity size was normal. Wall thickness was at the      upper limits of normal. Systolic function was normal. The estimated      ejection fraction was 55-60%. No diagnostic evidence for regional wall      motion abnormalities. Left ventricular diastolic function parameters were      normal.   2. Mitral valve: There was mild regurgitation.   3. Pulmonary arteries: Systolic pressure was within the normal range,      estimated to be 27mm Hg.   Impressions:  This study is compared with previous dated 08/07/2019: Prior   echocardiography showed mildly elevated PASP. Otherwise, similar findings.     09/2023  RF negative  ESR normal  CRP normal  Scleroderma panel: RP11/155 positive    03/2023  ALVARO screen negative  dsDNA negative  Vitamin D 40.4 normal  CRP normal ESR normal  CCP negative  Uric acid 5.6 normal  RF 22 borderline positive    Iza June DO  [facility]  03/20/2024

## 2024-03-22 PROBLEM — L94.3 SCLERODACTYLY: Status: ACTIVE | Noted: 2024-03-22

## 2024-03-22 PROBLEM — M72.0 CONTRACTURE, PALMAR FASCIA: Status: ACTIVE | Noted: 2024-03-22

## 2024-03-29 ENCOUNTER — TELEPHONE (OUTPATIENT)
Dept: SURGERY | Facility: CLINIC | Age: 64
End: 2024-03-29

## 2024-04-19 DIAGNOSIS — K21.00 REFLUX ESOPHAGITIS: ICD-10-CM

## 2024-04-20 RX ORDER — ESOMEPRAZOLE MAGNESIUM 40 MG/1
40 CAPSULE, DELAYED RELEASE ORAL 2 TIMES DAILY
Qty: 60 CAPSULE | Refills: 0 | Status: SHIPPED | OUTPATIENT
Start: 2024-04-20

## 2024-05-23 DIAGNOSIS — K21.00 REFLUX ESOPHAGITIS: ICD-10-CM

## 2024-05-24 ENCOUNTER — LAB ENCOUNTER (OUTPATIENT)
Dept: LAB | Facility: HOSPITAL | Age: 64
End: 2024-05-24
Attending: INTERNAL MEDICINE

## 2024-05-24 ENCOUNTER — HOSPITAL ENCOUNTER (OUTPATIENT)
Dept: CV DIAGNOSTICS | Facility: HOSPITAL | Age: 64
Discharge: HOME | End: 2024-05-24
Attending: INTERNAL MEDICINE

## 2024-05-24 ENCOUNTER — APPOINTMENT (OUTPATIENT)
Dept: RESPIRATORY THERAPY | Facility: HOSPITAL | Age: 64
End: 2024-05-24
Attending: INTERNAL MEDICINE

## 2024-05-24 ENCOUNTER — HOSPITAL ENCOUNTER (OUTPATIENT)
Dept: CT IMAGING | Facility: HOSPITAL | Age: 64
Discharge: HOME | End: 2024-05-24
Attending: INTERNAL MEDICINE

## 2024-05-24 DIAGNOSIS — R06.00 DYSPNEA, UNSPECIFIED TYPE: ICD-10-CM

## 2024-05-24 DIAGNOSIS — L94.3 SCLERODACTYLY: ICD-10-CM

## 2024-05-24 DIAGNOSIS — M34.9 DIFFUSE SCLERODERMA (HCC): ICD-10-CM

## 2024-05-24 DIAGNOSIS — I73.01 RAYNAUD'S DISEASE WITH GANGRENE (HCC): ICD-10-CM

## 2024-05-24 LAB
ALBUMIN SERPL-MCNC: 3.5 G/DL (ref 3.4–5)
ALBUMIN/GLOB SERPL: 1 {RATIO} (ref 1–2)
ALP LIVER SERPL-CCNC: 75 U/L
ALT SERPL-CCNC: 24 U/L
ANION GAP SERPL CALC-SCNC: 2 MMOL/L (ref 0–18)
AST SERPL-CCNC: 30 U/L (ref 15–37)
BASOPHILS # BLD AUTO: 0.09 X10(3) UL (ref 0–0.2)
BASOPHILS NFR BLD AUTO: 1.3 %
BILIRUB SERPL-MCNC: 0.5 MG/DL (ref 0.1–2)
BILIRUB UR QL STRIP.AUTO: NEGATIVE
BUN BLD-MCNC: 12 MG/DL (ref 9–23)
CALCIUM BLD-MCNC: 9.2 MG/DL (ref 8.5–10.1)
CHLORIDE SERPL-SCNC: 106 MMOL/L (ref 98–112)
CLARITY UR REFRACT.AUTO: CLEAR
CO2 SERPL-SCNC: 32 MMOL/L (ref 21–32)
CREAT BLD-MCNC: 0.71 MG/DL
EGFRCR SERPLBLD CKD-EPI 2021: 95 ML/MIN/1.73M2 (ref 60–?)
EOSINOPHIL # BLD AUTO: 0.66 X10(3) UL (ref 0–0.7)
EOSINOPHIL NFR BLD AUTO: 9.3 %
ERYTHROCYTE [DISTWIDTH] IN BLOOD BY AUTOMATED COUNT: 12.7 %
FASTING STATUS PATIENT QL REPORTED: YES
GLOBULIN PLAS-MCNC: 3.6 G/DL (ref 2.8–4.4)
GLUCOSE BLD-MCNC: 97 MG/DL (ref 70–99)
GLUCOSE UR STRIP.AUTO-MCNC: NORMAL MG/DL
HCT VFR BLD AUTO: 42.2 %
HGB BLD-MCNC: 14.3 G/DL
IMM GRANULOCYTES # BLD AUTO: 0.02 X10(3) UL (ref 0–1)
IMM GRANULOCYTES NFR BLD: 0.3 %
KETONES UR STRIP.AUTO-MCNC: NEGATIVE MG/DL
LEUKOCYTE ESTERASE UR QL STRIP.AUTO: NEGATIVE
LYMPHOCYTES # BLD AUTO: 1.92 X10(3) UL (ref 1–4)
LYMPHOCYTES NFR BLD AUTO: 27 %
MCH RBC QN AUTO: 30.2 PG (ref 26–34)
MCHC RBC AUTO-ENTMCNC: 33.9 G/DL (ref 31–37)
MCV RBC AUTO: 89 FL
MONOCYTES # BLD AUTO: 0.52 X10(3) UL (ref 0.1–1)
MONOCYTES NFR BLD AUTO: 7.3 %
NEUTROPHILS # BLD AUTO: 3.9 X10 (3) UL (ref 1.5–7.7)
NEUTROPHILS # BLD AUTO: 3.9 X10(3) UL (ref 1.5–7.7)
NEUTROPHILS NFR BLD AUTO: 54.8 %
NITRITE UR QL STRIP.AUTO: NEGATIVE
OSMOLALITY SERPL CALC.SUM OF ELEC: 290 MOSM/KG (ref 275–295)
PH UR STRIP.AUTO: 7.5 [PH] (ref 5–8)
PLATELET # BLD AUTO: 209 10(3)UL (ref 150–450)
POTASSIUM SERPL-SCNC: 5.1 MMOL/L (ref 3.5–5.1)
PROT SERPL-MCNC: 7.1 G/DL (ref 6.4–8.2)
PROT UR STRIP.AUTO-MCNC: NEGATIVE MG/DL
RBC # BLD AUTO: 4.74 X10(6)UL
RBC UR QL AUTO: NEGATIVE
SODIUM SERPL-SCNC: 140 MMOL/L (ref 136–145)
SP GR UR STRIP.AUTO: 1.01 (ref 1–1.03)
UROBILINOGEN UR STRIP.AUTO-MCNC: NORMAL MG/DL
WBC # BLD AUTO: 7.1 X10(3) UL (ref 4–11)

## 2024-05-24 PROCEDURE — 36415 COLL VENOUS BLD VENIPUNCTURE: CPT | Performed by: INTERNAL MEDICINE

## 2024-05-24 PROCEDURE — 93306 TTE W/DOPPLER COMPLETE: CPT | Performed by: INTERNAL MEDICINE

## 2024-05-24 PROCEDURE — 80053 COMPREHEN METABOLIC PANEL: CPT | Performed by: INTERNAL MEDICINE

## 2024-05-24 PROCEDURE — 81003 URINALYSIS AUTO W/O SCOPE: CPT

## 2024-05-24 PROCEDURE — 85025 COMPLETE CBC W/AUTO DIFF WBC: CPT | Performed by: INTERNAL MEDICINE

## 2024-05-24 PROCEDURE — 71250 CT THORAX DX C-: CPT | Performed by: INTERNAL MEDICINE

## 2024-05-24 RX ORDER — ESOMEPRAZOLE MAGNESIUM 40 MG/1
40 CAPSULE, DELAYED RELEASE ORAL 2 TIMES DAILY
Qty: 60 CAPSULE | Refills: 0 | Status: SHIPPED | OUTPATIENT
Start: 2024-05-24

## 2024-05-24 NOTE — TELEPHONE ENCOUNTER
Last office visit: 3/27/2023  Last Refill: 4/20/2024  Return To Clinic: 6 months from last office visit  Protocol:          Medication Quantity Refills Start End   Esomeprazole Magnesium 40 MG Oral Capsule Delayed Release 60 capsule 0 4/20/2024 --   Sig:   Take 1 capsule (40 mg total) by mouth 2 (two) times daily.     Route:   Oral

## 2024-05-28 ENCOUNTER — PATIENT MESSAGE (OUTPATIENT)
Dept: FAMILY MEDICINE CLINIC | Facility: CLINIC | Age: 64
End: 2024-05-28

## 2024-05-28 NOTE — TELEPHONE ENCOUNTER
From: Brandy Hope  To: Brianne Serna  Sent: 5/28/2024 1:57 PM CDT  Subject: prior authorization need for Esomeprazole Magnesium    Fidel Decker  I received a letter from my primary insurance [employer]  They are stating that they have tried to reach out to you above getting prior authorization approval but have not received a response. According to their records the existing approval expires on June 16, 2024. I have always received a 90 day supply and as of today they are only releasing 1 month at a time.   Can you submit a prior authorization for this drug please?  please let me know if I need to also come in for an annual which I believe I am overdue for.  thank you for taking time to read my message.  Brandy Hope

## 2024-05-30 ENCOUNTER — MED REC SCAN ONLY (OUTPATIENT)
Dept: FAMILY MEDICINE CLINIC | Facility: CLINIC | Age: 64
End: 2024-05-30

## 2024-05-30 NOTE — TELEPHONE ENCOUNTER
Received approval from patients insurance for medication Esomeprazole. Called patient and informed her. Scheduled her for annual physical on 07/02/2024 w/ Brianne Serna

## 2024-06-19 DIAGNOSIS — K21.00 REFLUX ESOPHAGITIS: ICD-10-CM

## 2024-06-20 RX ORDER — ESOMEPRAZOLE MAGNESIUM 40 MG/1
40 CAPSULE, DELAYED RELEASE ORAL 2 TIMES DAILY
Qty: 60 CAPSULE | Refills: 0 | Status: SHIPPED | OUTPATIENT
Start: 2024-06-20

## 2024-07-02 ENCOUNTER — OFFICE VISIT (OUTPATIENT)
Dept: FAMILY MEDICINE CLINIC | Facility: CLINIC | Age: 64
End: 2024-07-02
Payer: COMMERCIAL

## 2024-07-02 VITALS
WEIGHT: 178.38 LBS | DIASTOLIC BLOOD PRESSURE: 76 MMHG | TEMPERATURE: 98 F | BODY MASS INDEX: 30.08 KG/M2 | HEART RATE: 80 BPM | SYSTOLIC BLOOD PRESSURE: 128 MMHG | HEIGHT: 64.5 IN | RESPIRATION RATE: 16 BRPM

## 2024-07-02 DIAGNOSIS — M34.9 SCLERODERMA (HCC): ICD-10-CM

## 2024-07-02 DIAGNOSIS — Z13.89 SCREENING FOR GENITOURINARY CONDITION: ICD-10-CM

## 2024-07-02 DIAGNOSIS — E04.2 MULTIPLE THYROID NODULES: ICD-10-CM

## 2024-07-02 DIAGNOSIS — D22.9 MULTIPLE NEVI: ICD-10-CM

## 2024-07-02 DIAGNOSIS — Z00.00 LABORATORY EXAMINATION ORDERED AS PART OF A ROUTINE GENERAL MEDICAL EXAMINATION: ICD-10-CM

## 2024-07-02 DIAGNOSIS — Z00.00 ANNUAL PHYSICAL EXAM: Primary | ICD-10-CM

## 2024-07-02 DIAGNOSIS — H91.93 BILATERAL CHANGE IN HEARING: ICD-10-CM

## 2024-07-02 DIAGNOSIS — E66.9 OBESITY (BMI 30-39.9): ICD-10-CM

## 2024-07-02 DIAGNOSIS — K21.00 GASTROESOPHAGEAL REFLUX DISEASE WITH ESOPHAGITIS WITHOUT HEMORRHAGE: ICD-10-CM

## 2024-07-02 DIAGNOSIS — Z78.0 POST-MENOPAUSAL: ICD-10-CM

## 2024-07-02 DIAGNOSIS — Z12.31 ENCOUNTER FOR SCREENING MAMMOGRAM FOR MALIGNANT NEOPLASM OF BREAST: ICD-10-CM

## 2024-07-02 DIAGNOSIS — L60.2 THICKENED NAIL: ICD-10-CM

## 2024-07-02 DIAGNOSIS — E78.00 PURE HYPERCHOLESTEROLEMIA: ICD-10-CM

## 2024-07-02 PROCEDURE — 87101 SKIN FUNGI CULTURE: CPT | Performed by: NURSE PRACTITIONER

## 2024-07-02 PROCEDURE — 99396 PREV VISIT EST AGE 40-64: CPT | Performed by: NURSE PRACTITIONER

## 2024-07-02 PROCEDURE — 3074F SYST BP LT 130 MM HG: CPT | Performed by: NURSE PRACTITIONER

## 2024-07-02 PROCEDURE — 99214 OFFICE O/P EST MOD 30 MIN: CPT | Performed by: NURSE PRACTITIONER

## 2024-07-02 PROCEDURE — 3008F BODY MASS INDEX DOCD: CPT | Performed by: NURSE PRACTITIONER

## 2024-07-02 PROCEDURE — 3078F DIAST BP <80 MM HG: CPT | Performed by: NURSE PRACTITIONER

## 2024-07-02 NOTE — H&P
HPI:   Brandy Hope is a 63 year old female who presents for a complete physical exam. Symptoms: denies discharge, itching, burning or dysuria, history of hysterectomy- ovaries preserved .  Abnormal pap- denies.  Sexually active- yes, 1 partner   Last colonoscopy- due 2026  Last mammogram- due 10/2024--Dr. Olsen has ordered  No family history of breast or colon cancer. Dad- hx of lung cancer. Mom-heart condition.  Immunization- discussed Shingrix- pt declines.    Medication check/follow up. Patient has been taking medication as ordered with exception of her statin. Patient stopped this \"awhile ago\". Has been trying to focus on her diet. She has not had an UFHS previously- we discussed obtaining UFHS and repeating labs.  Denies any chest pain, shortness of breath, dizziness or lightheadedness. No heartburn while taking Esomeprazole- notes if she misses doses of medication heartburn returns.  Reports change in hearing bilaterally- this is not new- feels everything is \"muffled\" has not had formal hearing study  Has not seen derm  Reports thickened, yellow discolored toenails. Would like to me to look at today.    Traveling for family reunion for the holiday! Looking forward to this.     Wt Readings from Last 6 Encounters:   07/02/24 178 lb 6.4 oz (80.9 kg)   03/20/24 180 lb (81.6 kg)   01/18/24 177 lb (80.3 kg)   11/02/23 183 lb (83 kg)   09/19/23 178 lb (80.7 kg)   03/27/23 174 lb 9.6 oz (79.2 kg)     Body mass index is 30.15 kg/m².       Results for orders placed or performed in visit on 05/24/24   UA/M With Culture Reflex [E]    Specimen: Urine, clean catch   Result Value Ref Range    Urine Color Light-Yellow Yellow    Clarity Urine Clear Clear    Spec Gravity 1.009 1.005 - 1.030    Glucose Urine Normal Normal mg/dL    Bilirubin Urine Negative Negative    Ketones Urine Negative Negative mg/dL    Blood Urine Negative Negative    pH Urine 7.5 5.0 - 8.0    Protein Urine Negative Negative mg/dL    Urobilinogen Urine  Normal Normal mg/dL    Nitrite Urine Negative Negative    Leukocyte Esterase Urine Negative Negative    Microscopic Microscopic not indicated         Current Outpatient Medications   Medication Sig Dispense Refill    ESOMEPRAZOLE MAGNESIUM 40 MG Oral Capsule Delayed Release TAKE 1 CAPSULE(40 MG) BY MOUTH TWICE DAILY 60 capsule 0    cyclobenzaprine 5 MG Oral Tab Take 1 tablet (5 mg total) by mouth 3 (three) times daily as needed for Muscle spasms. 30 tablet 0    HYDROCHLOROTHIAZIDE 25 MG Oral Tab TAKE 1 TABLET(25 MG) BY MOUTH TWICE DAILY FOR DIURETIC 180 tablet 1    Zinc 50 MG Oral Cap Take by mouth.      atorvastatin 40 MG Oral Tab Take 1 tablet (40 mg total) by mouth nightly. 90 tablet 1    Ascorbic Acid (VITAMIN C) 1000 MG Oral Tab Take 2 tablets (2,000 mg total) by mouth daily.      triamcinolone acetonide 0.1 % External Ointment Apply 1 Application topically 2 (two) times daily. 80 g 1    Vitamin D3, Cholecalciferol, 125 MCG (5000 UT) Oral Cap Take 2 capsules (10,000 Units total) by mouth daily.      Hyaluronic Acid-Vitamin C (HYALURONIC ACID OR) Take 100 mg by mouth.      Omega-3 Fatty Acids (FISH OIL) 1200 MG Oral Capsule Delayed Release Take by mouth.      Aloe Oral Liquid Take by mouth. 4 ounces daily      Multiple Vitamins-Minerals (MULTIVITAMIN OR) Take  by mouth daily.        Past Medical History:    Anemia, unspecified    Back problem    Chest pain, unspecified    Cough    Degeneration of lumbar or lumbosacral intervertebral disc    Degeneration of meniscus of right knee    Dysphagia, unspecified(787.20)    Esophageal reflux    Glucose intolerance (pre-diabetes)    High cholesterol    Lumbago    Neoplasm of uncertain behavior of skin    OTHER DISEASES    scleroderma    Other malaise and fatigue    Pure hypercholesterolemia    Scleroderma (HCC)    Visual impairment    glasses      Past Surgical History:   Procedure Laterality Date    Back surgery      laminectomy 2007,     Back surgery  9/30/2015     Lumbar fusion, Dr. Palacio          x4    Colonoscopy  06    Colonoscopy N/A 3/13/2016    Procedure: COLONOSCOPY;  Surgeon: Di Blackwell DO;  Location:  ENDOSCOPY    Dexa,bone density,axial skeleton  2003    Endometrial ablation      Hysterectomy  05/03/10    TVH, Rt. salpingectomy    Hysteroscopy,with endometrial  09    Performed by FABRIZIO SIM at [facility], [facility]    Knee replacement surgery Right 3/7/2016    Other surgical history  06    esophagogastroduodenoscopy    Other surgical history Left 2014    toe surgery-bunionectomy, fusion of toe      Family History   Problem Relation Age of Onset    Heart Disease Father     Diabetes Father     Other (CABG [Other]) Father     Cancer Father         Lung    Heart Disease Mother     Heart Attack Mother         AMI      Social History:   Social History     Socioeconomic History    Marital status:    Tobacco Use    Smoking status: Former     Current packs/day: 0.00     Types: Cigarettes     Quit date: 1980     Years since quittin.0    Smokeless tobacco: Never   Vaping Use    Vaping status: Never Used   Substance and Sexual Activity    Alcohol use: Yes     Alcohol/week: 0.0 standard drinks of alcohol     Comment: 1 a week     Drug use: No    Sexual activity: Yes     Partners: Male   Other Topics Concern    Caffeine Concern Yes     Comment: 1-3 c daily     Exercise No     Social Determinants of Health     Food Insecurity: Low Risk  (10/17/2023)    Received from [facility]    Food Insecurity     Have there been times that your food ran out, and you didn't have money to get more?: No     Are there times that you worry that this might happen?: No   Transportation Needs: Low Risk  (10/17/2023)    Received from [facility]    Transportation Needs     Do you have trouble getting transportation to  medical appointments?: No     How do you normally get to and from your appointments?: Other   Housing Stability: Low Risk  (10/17/2023)    [facility]    Housing Stability     Are you concerned about having a safe and reliable place to live?: No   Recent Concern: Housing Stability - At Risk (8/18/2023)    Received from [facility]     Occ: . : Y. Children: Y--45, 37, 35, 30.   Diet:  monitors     REVIEW OF SYSTEMS:   GENERAL: denies fevers, weakness, trouble sleeping or weight changes  SKIN: denies any unusual skin lesions or rashes  EYES:denies vision changes  HEENT: denies upper respiratory symptoms  LUNGS: denies cough or shortness of breath with exertion  CHEST:  denies breast changes or pain  CARDIOVASCULAR: denies chest pain or tightness on exertion: no edema  VASCULAR: denies leg cramps  GI: denies abdominal pain, bowel movement changes, blood in stool  : denies urinary problems, vaginal discharge or discomfort  MUSCULOSKELETAL: denies joint pain or stiffness  NEURO: denies headaches, tingling or dizziness  PSYCHE: denies depression or anxiety  HEMATOLOGIC: denies bleeding abnormalities  ENDOCRINE: denies temperature intolerance, polyuria, or excessive sweating.  LYMPHATICS: denies swollen glands  EXAM:   /70   Pulse 80   Temp 97.9 °F (36.6 °C) (Temporal)   Resp 16   Ht 5' 4.5\" (1.638 m)   Wt 178 lb 6.4 oz (80.9 kg)   LMP 01/30/2010   BMI 30.15 kg/m²   Body mass index is 30.15 kg/m².   GENERAL: well developed, well nourished and in no apparent distress  SKIN: no rashes, + multiple nevi, + thickened toenails with nail discoloration  HEENT: atraumatic, normocephalic,ears, nose and throat are normal  EYES: PERRLA, EOMI, sclera, conjunctiva are clear  NECK: supple,no adenopathy, + thyroid nodule  BREAST: symmetrical, no suspicious mass, no nipple dimpling or discharge.  LUNGS: clear  to auscultation bilateral, no rales, rhonchi or wheezing  CARDIO: RRR without murmur normal S1S2  ABD:  normal bowel sounds,soft, non tender, no masses, HSM or tenderness  : DEFERRED  MUSCULOSKELETAL: gait florin,l no gross M/S defect.  EXTREMITIES: no clubbing, cyanosis, or edema  NEURO: oriented times three, cranial nerves are grossly intact, no gross motor or sensory deficit.  ASSESSMENT AND PLAN:   Brandy Hope is a 63 year old female who presents for a complete physical exam.    Pap and pelvic- deferred.    Self breast exam explained. Health maintenance guidance given including vision and dental exams, vitamin D 1,000 iu daily with calcium 1,500 mg daily.  Lifestyle guidance provided recommended low fat diet and aerobic exercise 30 minutes 3-4 times weekly.  The patient indicates understanding of these issues and agrees to the plan.  The patient is asked to return for complete physical yearly. Med check 6 months.      1. Annual physical exam    2. Laboratory examination ordered as part of a routine general medical examination  - CBC With Differential With Platelet; Future  - Comp Metabolic Panel (14); Future  - Lipid Panel; Future  - TSH W Reflex To Free T4; Future    3. Screening for genitourinary condition  - Urinalysis with Culture Reflex; Future    4. Encounter for screening mammogram for malignant neoplasm of breast  - [facility] ROSAURA 2D+3D SCREENING BILAT (CPT=77067/22688); Future    5. Post-menopausal  - XR DEXA BONE DENSITOMETRY (CPT=77080); Future    6. Thickened nail  - Fungus Hair, Skin, Nail Culture (Dermatophyte); Future  - Fungus Hair, Skin, Nail Culture (Dermatophyte)    7. Scleroderma (HCC)    8. Pure hypercholesterolemia  - Comp Metabolic Panel (14); Future  - Lipid Panel; Future    9. Obesity (BMI 30-39.9)    10. Gastroesophageal reflux disease with esophagitis without hemorrhage    11. Multiple nevi  - Derm Referral - In Network    12. Bilateral change in hearing  - ENT Referral - External    13.  Multiple thyroid nodules  - US THYROID (CPT=76536); Future      ENT referral placed.  Derm referral placed.  Thyroid US as ordered- due for repeat.  HS information provided.  Nail culture completed.  Continue medication as ordered--await Lipids panel results and HS for resuming statin.  Mammogram- per gyne.  Declines Shingrix at this time.  Continue to focus on healthy eating, routine exercise, weight loss. Body mass index is 30.15 kg/m².

## 2024-07-16 ENCOUNTER — LAB ENCOUNTER (OUTPATIENT)
Dept: LAB | Age: 64
End: 2024-07-16
Attending: NURSE PRACTITIONER
Payer: COMMERCIAL

## 2024-07-16 ENCOUNTER — HOSPITAL ENCOUNTER (OUTPATIENT)
Dept: MRI IMAGING | Age: 64
Discharge: HOME | End: 2024-07-16
Attending: NURSE PRACTITIONER
Payer: COMMERCIAL

## 2024-07-16 DIAGNOSIS — E78.00 PURE HYPERCHOLESTEROLEMIA: ICD-10-CM

## 2024-07-16 DIAGNOSIS — Z13.89 SCREENING FOR GENITOURINARY CONDITION: ICD-10-CM

## 2024-07-16 DIAGNOSIS — Z98.1 S/P LUMBAR FUSION: ICD-10-CM

## 2024-07-16 DIAGNOSIS — Z00.00 LABORATORY EXAMINATION ORDERED AS PART OF A ROUTINE GENERAL MEDICAL EXAMINATION: ICD-10-CM

## 2024-07-16 DIAGNOSIS — M54.50 LUMBAR BACK PAIN: ICD-10-CM

## 2024-07-16 LAB
ALBUMIN SERPL-MCNC: 4.3 G/DL (ref 3.2–4.8)
ALBUMIN/GLOB SERPL: 1.6 {RATIO} (ref 1–2)
ALP LIVER SERPL-CCNC: 64 U/L
ALT SERPL-CCNC: 20 U/L
ANION GAP SERPL CALC-SCNC: 7 MMOL/L (ref 0–18)
AST SERPL-CCNC: 29 U/L (ref ?–34)
BASOPHILS # BLD AUTO: 0.07 X10(3) UL (ref 0–0.2)
BASOPHILS NFR BLD AUTO: 1.2 %
BILIRUB SERPL-MCNC: 0.9 MG/DL (ref 0.2–1.1)
BILIRUB UR QL STRIP.AUTO: NEGATIVE
BUN BLD-MCNC: 7 MG/DL (ref 9–23)
CALCIUM BLD-MCNC: 9.6 MG/DL (ref 8.7–10.4)
CHLORIDE SERPL-SCNC: 105 MMOL/L (ref 98–112)
CHOLEST SERPL-MCNC: 296 MG/DL (ref ?–200)
CLARITY UR REFRACT.AUTO: CLEAR
CO2 SERPL-SCNC: 30 MMOL/L (ref 21–32)
CREAT BLD-MCNC: 0.69 MG/DL
EGFRCR SERPLBLD CKD-EPI 2021: 97 ML/MIN/1.73M2 (ref 60–?)
EOSINOPHIL # BLD AUTO: 0.28 X10(3) UL (ref 0–0.7)
EOSINOPHIL NFR BLD AUTO: 5 %
ERYTHROCYTE [DISTWIDTH] IN BLOOD BY AUTOMATED COUNT: 12.4 %
FASTING PATIENT LIPID ANSWER: YES
FASTING STATUS PATIENT QL REPORTED: YES
GLOBULIN PLAS-MCNC: 2.7 G/DL (ref 2.8–4.4)
GLUCOSE BLD-MCNC: 91 MG/DL (ref 70–99)
GLUCOSE UR STRIP.AUTO-MCNC: NORMAL MG/DL
HCT VFR BLD AUTO: 44.2 %
HDLC SERPL-MCNC: 84 MG/DL (ref 40–59)
HGB BLD-MCNC: 14.4 G/DL
IMM GRANULOCYTES # BLD AUTO: 0.02 X10(3) UL (ref 0–1)
IMM GRANULOCYTES NFR BLD: 0.4 %
KETONES UR STRIP.AUTO-MCNC: NEGATIVE MG/DL
LDLC SERPL CALC-MCNC: 187 MG/DL (ref ?–100)
LEUKOCYTE ESTERASE UR QL STRIP.AUTO: NEGATIVE
LYMPHOCYTES # BLD AUTO: 1.72 X10(3) UL (ref 1–4)
LYMPHOCYTES NFR BLD AUTO: 30.5 %
MCH RBC QN AUTO: 29.6 PG (ref 26–34)
MCHC RBC AUTO-ENTMCNC: 32.6 G/DL (ref 31–37)
MCV RBC AUTO: 90.9 FL
MONOCYTES # BLD AUTO: 0.51 X10(3) UL (ref 0.1–1)
MONOCYTES NFR BLD AUTO: 9 %
NEUTROPHILS # BLD AUTO: 3.04 X10 (3) UL (ref 1.5–7.7)
NEUTROPHILS # BLD AUTO: 3.04 X10(3) UL (ref 1.5–7.7)
NEUTROPHILS NFR BLD AUTO: 53.9 %
NITRITE UR QL STRIP.AUTO: NEGATIVE
NONHDLC SERPL-MCNC: 212 MG/DL (ref ?–130)
OSMOLALITY SERPL CALC.SUM OF ELEC: 292 MOSM/KG (ref 275–295)
PH UR STRIP.AUTO: 7.5 [PH] (ref 5–8)
PLATELET # BLD AUTO: 226 10(3)UL (ref 150–450)
POTASSIUM SERPL-SCNC: 4.4 MMOL/L (ref 3.5–5.1)
PROT SERPL-MCNC: 7 G/DL (ref 5.7–8.2)
PROT UR STRIP.AUTO-MCNC: NEGATIVE MG/DL
RBC # BLD AUTO: 4.86 X10(6)UL
RBC UR QL AUTO: NEGATIVE
SODIUM SERPL-SCNC: 142 MMOL/L (ref 136–145)
SP GR UR STRIP.AUTO: 1.01 (ref 1–1.03)
TRIGL SERPL-MCNC: 142 MG/DL (ref 30–149)
TSI SER-ACNC: 2.33 MIU/ML (ref 0.55–4.78)
UROBILINOGEN UR STRIP.AUTO-MCNC: NORMAL MG/DL
VLDLC SERPL CALC-MCNC: 29 MG/DL (ref 0–30)
WBC # BLD AUTO: 5.6 X10(3) UL (ref 4–11)

## 2024-07-16 PROCEDURE — 80050 GENERAL HEALTH PANEL: CPT | Performed by: NURSE PRACTITIONER

## 2024-07-16 PROCEDURE — 81003 URINALYSIS AUTO W/O SCOPE: CPT | Performed by: NURSE PRACTITIONER

## 2024-07-16 PROCEDURE — 80061 LIPID PANEL: CPT | Performed by: NURSE PRACTITIONER

## 2024-07-16 PROCEDURE — A9575 INJ GADOTERATE MEGLUMI 0.1ML: HCPCS

## 2024-07-16 PROCEDURE — 72158 MRI LUMBAR SPINE W/O & W/DYE: CPT | Performed by: NURSE PRACTITIONER

## 2024-07-16 RX ORDER — GADOTERATE MEGLUMINE 376.9 MG/ML
20 INJECTION INTRAVENOUS
Status: COMPLETED | OUTPATIENT
Start: 2024-07-16 | End: 2024-07-16

## 2024-07-16 RX ORDER — CICLOPIROX 80 MG/ML
SOLUTION TOPICAL
Qty: 6 ML | Refills: 11 | Status: SHIPPED | OUTPATIENT
Start: 2024-07-16 | End: 2024-07-18

## 2024-07-16 RX ADMIN — GADOTERATE MEGLUMINE 17 ML: 376.9 INJECTION INTRAVENOUS at 09:39:00

## 2024-07-18 DIAGNOSIS — E78.00 HYPERCHOLESTEROLEMIA: Primary | ICD-10-CM

## 2024-07-18 DIAGNOSIS — Z79.899 HIGH RISK MEDICATION USE: Primary | ICD-10-CM

## 2024-07-18 RX ORDER — CICLOPIROX 80 MG/ML
SOLUTION TOPICAL
Qty: 6.6 ML | Refills: 2 | Status: SHIPPED | OUTPATIENT
Start: 2024-07-18

## 2024-07-18 RX ORDER — ATORVASTATIN CALCIUM 40 MG/1
40 TABLET, FILM COATED ORAL NIGHTLY
Qty: 90 TABLET | Refills: 0 | Status: SHIPPED | OUTPATIENT
Start: 2024-07-18

## 2024-07-18 NOTE — TELEPHONE ENCOUNTER
Received PA request for patients medication Ciclopirox. Per PA request, medication needs to be dispensed per 6.60mL.     Changed prescription as okayed by Brianne Serna and sent PA to payer.

## 2024-07-22 RX ORDER — TERBINAFINE HYDROCHLORIDE 250 MG/1
250 TABLET ORAL DAILY
Qty: 90 TABLET | Refills: 0 | Status: SHIPPED | OUTPATIENT
Start: 2024-07-22 | End: 2024-07-22 | Stop reason: CLARIF

## 2024-07-22 NOTE — TELEPHONE ENCOUNTER
Called pharmacy and discontinued Lamisil Rx. Patient needs to be aware of this medication prior to sending Rx since it is a high risk medication.     Awaiting call back from patient.

## 2024-07-22 NOTE — TELEPHONE ENCOUNTER
Received denial letter from insurance for medication Ciclopirox solution. Is there an alternative medication that you would liek to prescribe?

## 2024-07-22 NOTE — TELEPHONE ENCOUNTER
Rx sent to pharmacy. Future labs ordered.     Attempted to call patient. No answer, left voice message to call back.

## 2024-07-22 NOTE — TELEPHONE ENCOUNTER
Please call pt with below.  Pt can start Lamisil 250 mg po daily x 12 weeks. Repeat LFTs in 6 wks and then again at 12 wks.

## 2024-07-31 ENCOUNTER — HOSPITAL ENCOUNTER (OUTPATIENT)
Dept: BONE DENSITY | Age: 64
Discharge: HOME | End: 2024-07-31
Attending: NURSE PRACTITIONER
Payer: COMMERCIAL

## 2024-07-31 ENCOUNTER — HOSPITAL ENCOUNTER (OUTPATIENT)
Dept: ULTRASOUND IMAGING | Age: 64
Discharge: HOME | End: 2024-07-31
Attending: NURSE PRACTITIONER
Payer: COMMERCIAL

## 2024-07-31 DIAGNOSIS — Z78.0 POST-MENOPAUSAL: ICD-10-CM

## 2024-07-31 DIAGNOSIS — E04.2 MULTIPLE THYROID NODULES: ICD-10-CM

## 2024-07-31 PROCEDURE — 77080 DXA BONE DENSITY AXIAL: CPT | Performed by: NURSE PRACTITIONER

## 2024-07-31 PROCEDURE — 76536 US EXAM OF HEAD AND NECK: CPT | Performed by: NURSE PRACTITIONER

## 2024-09-13 DIAGNOSIS — R60.0 BILATERAL EDEMA OF LOWER EXTREMITY: ICD-10-CM

## 2024-09-13 RX ORDER — HYDROCHLOROTHIAZIDE 25 MG/1
TABLET ORAL
Qty: 180 TABLET | Refills: 0 | Status: SHIPPED | OUTPATIENT
Start: 2024-09-13

## 2024-10-07 ENCOUNTER — OFFICE VISIT (OUTPATIENT)
Dept: FAMILY MEDICINE CLINIC | Facility: CLINIC | Age: 64
End: 2024-10-07
Payer: COMMERCIAL

## 2024-10-07 VITALS
WEIGHT: 178 LBS | HEART RATE: 77 BPM | TEMPERATURE: 99 F | RESPIRATION RATE: 16 BRPM | SYSTOLIC BLOOD PRESSURE: 138 MMHG | HEIGHT: 64.5 IN | OXYGEN SATURATION: 98 % | BODY MASS INDEX: 30.02 KG/M2 | DIASTOLIC BLOOD PRESSURE: 96 MMHG

## 2024-10-07 DIAGNOSIS — J40 SINOBRONCHITIS: Primary | ICD-10-CM

## 2024-10-07 DIAGNOSIS — J32.9 SINOBRONCHITIS: Primary | ICD-10-CM

## 2024-10-07 RX ORDER — BENZONATATE 200 MG/1
200 CAPSULE ORAL 3 TIMES DAILY PRN
Qty: 21 CAPSULE | Refills: 0 | Status: SHIPPED | OUTPATIENT
Start: 2024-10-07 | End: 2024-10-14

## 2024-10-07 RX ORDER — PREDNISONE 20 MG/1
TABLET ORAL
Qty: 10 TABLET | Refills: 0 | Status: SHIPPED | OUTPATIENT
Start: 2024-10-07

## 2024-10-07 RX ORDER — ALBUTEROL SULFATE 90 UG/1
INHALANT RESPIRATORY (INHALATION)
Qty: 1 EACH | Refills: 0 | Status: SHIPPED | OUTPATIENT
Start: 2024-10-07

## 2024-10-07 RX ORDER — DOXYCYCLINE 100 MG/1
100 CAPSULE ORAL 2 TIMES DAILY
Qty: 20 CAPSULE | Refills: 0 | Status: SHIPPED | OUTPATIENT
Start: 2024-10-07 | End: 2024-10-17

## 2024-10-07 NOTE — PROGRESS NOTES
CHIEF COMPLAINT:     Chief Complaint   Patient presents with    Cough     C/o severe dry cough  Sx onset 1.5 wk   Denies fever, runny nose   No recent Covid test   OTC Zycam, Mucinex        HPI:   Brandy Hope is a 64 year old female who presents with complaints of feeling sick for the past 10 days.  Symptoms include raspy cough, chest congestion, post nasal drip, and wheezing.  The patient denies fever, ear pain, CP, SOB, nasal congestion, nasal drainage, abdominal pian, vomiting, and diarrhea.  The patient denies history of allergies and asthma.      Current Outpatient Medications   Medication Sig Dispense Refill    benzonatate 200 MG Oral Cap Take 1 capsule (200 mg total) by mouth 3 (three) times daily as needed for cough. 21 capsule 0    albuterol (PROAIR HFA) 108 (90 Base) MCG/ACT Inhalation Aero Soln 2 puffs every 4-6 hours prn wheezing or shortness of breath 1 each 0    doxycycline 100 MG Oral Cap Take 1 capsule (100 mg total) by mouth 2 (two) times daily for 10 days. 20 capsule 0    predniSONE 20 MG Oral Tab Take 2 tablets po daily for 5 days 10 tablet 0    HYDROCHLOROTHIAZIDE 25 MG Oral Tab TAKE 1 TABLET(25 MG) BY MOUTH TWICE DAILY FOR DIURETIC 180 tablet 0    atorvastatin 40 MG Oral Tab Take 1 tablet (40 mg total) by mouth nightly. 90 tablet 0    Ciclopirox 8 % External Solution Apply 1 application to the nails nightly for 48 weeks 6.6 mL 2    ESOMEPRAZOLE MAGNESIUM 40 MG Oral Capsule Delayed Release TAKE 1 CAPSULE(40 MG) BY MOUTH TWICE DAILY 60 capsule 0    cyclobenzaprine 5 MG Oral Tab Take 1 tablet (5 mg total) by mouth 3 (three) times daily as needed for Muscle spasms. 30 tablet 0    Zinc 50 MG Oral Cap Take by mouth.      Ascorbic Acid (VITAMIN C) 1000 MG Oral Tab Take 2 tablets (2,000 mg total) by mouth daily.      triamcinolone acetonide 0.1 % External Ointment Apply 1 Application topically 2 (two) times daily. 80 g 1    Vitamin D3, Cholecalciferol, 125 MCG (5000 UT) Oral Cap Take 2 capsules  (10,000 Units total) by mouth daily.      Hyaluronic Acid-Vitamin C (HYALURONIC ACID OR) Take 100 mg by mouth.      Omega-3 Fatty Acids (FISH OIL) 1200 MG Oral Capsule Delayed Release Take by mouth.      Aloe Oral Liquid Take by mouth. 4 ounces daily      Multiple Vitamins-Minerals (MULTIVITAMIN OR) Take  by mouth daily.        Past Medical History:    Anemia, unspecified    Back problem    Chest pain, unspecified    Cough    Degeneration of lumbar or lumbosacral intervertebral disc    Degeneration of meniscus of right knee    Dysphagia, unspecified(787.20)    Esophageal reflux    Glucose intolerance (pre-diabetes)    High cholesterol    Lumbago    Neoplasm of uncertain behavior of skin    OTHER DISEASES    scleroderma    Other malaise and fatigue    Pure hypercholesterolemia    Scleroderma (HCC)    Visual impairment    glasses      Social History:  Social History     Socioeconomic History    Marital status:    Tobacco Use    Smoking status: Former     Current packs/day: 0.00     Types: Cigarettes     Quit date: 1980     Years since quittin.2    Smokeless tobacco: Never   Vaping Use    Vaping status: Never Used   Substance and Sexual Activity    Alcohol use: Yes     Alcohol/week: 0.0 standard drinks of alcohol     Comment: 1 a week     Drug use: No    Sexual activity: Yes     Partners: Male   Other Topics Concern    Caffeine Concern Yes     Comment: 1-3 c daily     Exercise No     Social Determinants of Health     Food Insecurity: Low Risk  (10/17/2023)    Received from [facility]    Food Insecurity     Have there been times that your food ran out, and you didn't have money to get more?: No     Are there times that you worry that this might happen?: No   Transportation Needs: Low Risk  (10/17/2023)    Received from [facility]    Transportation Needs     Do you have trouble getting  transportation to medical appointments?: No     How do you normally get to and from your appointments?: Other   Housing Stability: Low Risk  (10/17/2023)    [facility]    Housing Stability     Are you concerned about having a safe and reliable place to live?: No   Recent Concern: Housing Stability - At Risk (8/18/2023)    [email or web address]        REVIEW OF SYSTEMS:   GENERAL: Denies fever, chills,weight change, decreased appetite  SKIN: Denies rashes, skin wounds or ulcers.  EYES: Denies blurred vision or double vision  HENT: See HPI  CHEST: Denies chest pain, or palpitations  LUNGS: See HPI  GI: Denies abdominal pain, N/V/C/D.   MUSCULOSKELETAL: no arthralgia or swollen joints  LYMPH:  Denies lymphadenopathy  NEURO: Denies headaches or lightheadedness      EXAM:   BP (!) 138/96   Pulse 77   Temp 98.8 °F (37.1 °C)   Resp 16   Ht 5' 4.5\" (1.638 m)   Wt 178 lb (80.7 kg)   LMP 01/30/2010   SpO2 98%   BMI 30.08 kg/m²   GENERAL: well developed, well nourished,in no apparent distress, cooperative   SKIN: no rashes, nosuspicious lesions, no abnormal pigmentation  HEAD: atraumatic, normocephalic  EYES: EOM intact, PERRL.  Conjunctiva normal.  Cornea clear.  Lid margins normal.  No active drainage.  EARS: Right TM normal, no bulging, no retraction, no fluid, bony landmarks normal.  Left TM normal, no bulging, no retraction, no fluid, bony landmarks normal.    NOSE: nostrils patent, no discharge, nasal mucosa pink and not inflamed.  No sinus tenderness.  THROAT: oral mucosa pink, moist and intact. No visible dental caries. Posterior pharynx without erythema or exudates.  NECK: supple, non-tender.  LUNGS: Coarse breath sounds at the bases.  Breathing is non labored. +Raspy cough.   CARDIO: RRR without murmur  GI: No visible scars, or masses. BS's present x4. No palpable masses or hepatosplenomegaly.  Non tender.   No guarding or rebound tenderness  EXTREMITIES: no cyanosis, clubbing or edema.  Homans NEG.  Dorsalis Pedis 2+.  LYMPH:  No lymphadenopathy.    NEURO: A&Ox3.  CN II-XII intact.  No focal deficits.  Coordination and Gait normal.  Kernig and Brudzinski's are negative.      ASSESSMENT AND PLAN:     ASSESSMENT:  Encounter Diagnosis   Name Primary?    Sinobronchitis Yes       PLAN:    Patient Instructions   Doxycycline  Prednisone   Albuterol  Tessalon  Follow up with PCP   If worse seek treatment

## 2024-10-20 DIAGNOSIS — E78.00 HYPERCHOLESTEROLEMIA: ICD-10-CM

## 2024-10-21 RX ORDER — ATORVASTATIN CALCIUM 40 MG/1
40 TABLET, FILM COATED ORAL NIGHTLY
Qty: 90 TABLET | Refills: 0 | Status: SHIPPED | OUTPATIENT
Start: 2024-10-21

## 2024-10-21 NOTE — TELEPHONE ENCOUNTER
Last Office Visit: 07/02/2024    Last Refill:   Medication Quantity Refills Start End   atorvastatin 40 MG Oral Tab 90 tablet 0 7/18/2024 --     Return to Clinic: 01/02/2025    Protocol: passed

## 2024-10-31 ENCOUNTER — HOSPITAL ENCOUNTER (OUTPATIENT)
Dept: MAMMOGRAPHY | Age: 64
Discharge: HOME | End: 2024-10-31
Attending: NURSE PRACTITIONER
Payer: COMMERCIAL

## 2024-10-31 DIAGNOSIS — N64.89 BREAST ASYMMETRY: Primary | ICD-10-CM

## 2024-10-31 DIAGNOSIS — Z12.31 ENCOUNTER FOR SCREENING MAMMOGRAM FOR MALIGNANT NEOPLASM OF BREAST: ICD-10-CM

## 2024-10-31 PROCEDURE — 77067 SCR MAMMO BI INCL CAD: CPT | Performed by: NURSE PRACTITIONER

## 2024-10-31 PROCEDURE — 77063 BREAST TOMOSYNTHESIS BI: CPT | Performed by: NURSE PRACTITIONER

## 2024-11-07 ENCOUNTER — TELEPHONE (OUTPATIENT)
Dept: FAMILY MEDICINE CLINIC | Facility: CLINIC | Age: 64
End: 2024-11-07

## 2024-11-07 NOTE — TELEPHONE ENCOUNTER
Is there redness or warmth? I would recommend she is evaluated at [facility] today rather than waiting for appt next week. May need imaging and depending on location/examination imaging to rule out DVT.

## 2024-11-07 NOTE — TELEPHONE ENCOUNTER
Spoke to patient. Reports pain on her left knee radiating to the lateral inner side of the knee with some swelling. Symptoms started last weekend, unknown cause.     States that when she's sitting and bending, it's fine. When standing up and while walking, a lot of pinching and not allowing her to walk normally and is uncomfortable. Pain level 8 to 10 when it occurs.    Patient took Ibuprofen yesterday and advil today with temporary relief. She also uses knee wrap and over-the-counter patches to help with the pain.    States that the same thing happened couple of years ago and pain just went away but this time it just got worse. Patient is going out of town this afternoon and states that she cannot miss her weekend trip but was hoping if she can be seen by Monday.     Brianne, please advise. Thank you.

## 2024-11-07 NOTE — TELEPHONE ENCOUNTER
Per patient, there's no redness or warmth. Informed of VIK Martínez recommendation below.   Patient verbalized understanding and agreed with plan.

## 2024-11-07 NOTE — TELEPHONE ENCOUNTER
Patient is having a lot of radiating pain on the left knee. Patient would like to have xray or see if she can be seen. Please advise.

## 2024-11-14 ENCOUNTER — HOSPITAL ENCOUNTER (OUTPATIENT)
Dept: MAMMOGRAPHY | Facility: HOSPITAL | Age: 64
Discharge: HOME | End: 2024-11-14
Attending: NURSE PRACTITIONER
Payer: COMMERCIAL

## 2024-11-14 DIAGNOSIS — N64.89 BREAST ASYMMETRY: ICD-10-CM

## 2024-11-14 PROCEDURE — 77065 DX MAMMO INCL CAD UNI: CPT | Performed by: NURSE PRACTITIONER

## 2024-11-14 PROCEDURE — 77061 BREAST TOMOSYNTHESIS UNI: CPT | Performed by: NURSE PRACTITIONER

## 2024-11-14 PROCEDURE — 76642 ULTRASOUND BREAST LIMITED: CPT | Performed by: NURSE PRACTITIONER

## 2024-12-15 DIAGNOSIS — R60.0 BILATERAL EDEMA OF LOWER EXTREMITY: ICD-10-CM

## 2024-12-16 RX ORDER — HYDROCHLOROTHIAZIDE 25 MG/1
TABLET ORAL
Qty: 180 TABLET | Refills: 0 | Status: SHIPPED | OUTPATIENT
Start: 2024-12-16

## 2025-01-03 ENCOUNTER — TELEPHONE (OUTPATIENT)
Dept: FAMILY MEDICINE CLINIC | Facility: CLINIC | Age: 65
End: 2025-01-03

## 2025-01-03 ENCOUNTER — PATIENT MESSAGE (OUTPATIENT)
Dept: FAMILY MEDICINE CLINIC | Facility: CLINIC | Age: 65
End: 2025-01-03

## 2025-01-03 DIAGNOSIS — E78.00 HYPERCHOLESTEROLEMIA: Primary | ICD-10-CM

## 2025-01-03 DIAGNOSIS — K21.00 REFLUX ESOPHAGITIS: ICD-10-CM

## 2025-01-03 NOTE — TELEPHONE ENCOUNTER
Pt would like refill of ESOMEPRAZOLE MAGNESIUM 40 MG Oral Capsule Delayed Release sent to [id number] - [place], [place] - [address] AT [facility] [address], [phone], [phone] [[id number]] thank you.

## 2025-01-03 NOTE — TELEPHONE ENCOUNTER
LOV:07/02/24      NOV:Due this month for med check. No appointment scheduled.     Medication: Esomeprazole 40 mg 1 twice daily # 60         MCM sent to pt.to call the office and make an appointment for a med visit.

## 2025-01-07 ENCOUNTER — TELEPHONE (OUTPATIENT)
Dept: FAMILY MEDICINE CLINIC | Facility: CLINIC | Age: 65
End: 2025-01-07

## 2025-01-07 DIAGNOSIS — Z01.818 PRE-OP TESTING: Primary | ICD-10-CM

## 2025-01-07 RX ORDER — ESOMEPRAZOLE MAGNESIUM 40 MG/1
40 CAPSULE, DELAYED RELEASE ORAL 2 TIMES DAILY
Qty: 60 CAPSULE | Refills: 0 | Status: SHIPPED | OUTPATIENT
Start: 2025-01-07

## 2025-01-07 NOTE — TELEPHONE ENCOUNTER
Patient would like a lipid panel added to her blood work. She does have a diagnosis of hypercholesterolemia and is on atorvastatin. Last lipid panel was 07/16/24.  I pended the order to you for approval. Thank you

## 2025-01-07 NOTE — TELEPHONE ENCOUNTER
Received the pt pre-op paperwork from [facility].  Orders has been placed.  Surgery os scheduled for 03/18/2025 with Dr. Olmstead.

## 2025-01-08 ENCOUNTER — OFFICE VISIT (OUTPATIENT)
Dept: RHEUMATOLOGY | Facility: CLINIC | Age: 65
End: 2025-01-08
Payer: COMMERCIAL

## 2025-01-08 VITALS
SYSTOLIC BLOOD PRESSURE: 140 MMHG | TEMPERATURE: 98 F | HEIGHT: 64.5 IN | HEART RATE: 83 BPM | WEIGHT: 187.19 LBS | BODY MASS INDEX: 31.57 KG/M2 | OXYGEN SATURATION: 98 % | RESPIRATION RATE: 16 BRPM | DIASTOLIC BLOOD PRESSURE: 82 MMHG

## 2025-01-08 DIAGNOSIS — R06.00 DYSPNEA, UNSPECIFIED TYPE: ICD-10-CM

## 2025-01-08 DIAGNOSIS — L94.3 SCLERODACTYLY: ICD-10-CM

## 2025-01-08 DIAGNOSIS — M34.9 DIFFUSE SCLERODERMA (HCC): Primary | ICD-10-CM

## 2025-01-08 DIAGNOSIS — M51.369 DEGENERATION OF INTERVERTEBRAL DISC OF LUMBAR REGION, UNSPECIFIED WHETHER PAIN PRESENT: ICD-10-CM

## 2025-01-08 DIAGNOSIS — R76.8 RHEUMATOID FACTOR POSITIVE: ICD-10-CM

## 2025-01-08 DIAGNOSIS — I73.01 RAYNAUD'S DISEASE WITH GANGRENE (HCC): ICD-10-CM

## 2025-01-08 DIAGNOSIS — M72.0 CONTRACTURE, PALMAR FASCIA: ICD-10-CM

## 2025-01-08 PROCEDURE — 3008F BODY MASS INDEX DOCD: CPT | Performed by: INTERNAL MEDICINE

## 2025-01-08 PROCEDURE — G2211 COMPLEX E/M VISIT ADD ON: HCPCS | Performed by: INTERNAL MEDICINE

## 2025-01-08 PROCEDURE — 99214 OFFICE O/P EST MOD 30 MIN: CPT | Performed by: INTERNAL MEDICINE

## 2025-01-08 PROCEDURE — 3077F SYST BP >= 140 MM HG: CPT | Performed by: INTERNAL MEDICINE

## 2025-01-08 PROCEDURE — 3079F DIAST BP 80-89 MM HG: CPT | Performed by: INTERNAL MEDICINE

## 2025-01-08 NOTE — PROGRESS NOTES
RHEUMATOLOGY FOLLOW UP   Date of visit: 01/08/2025  ?  Chief Complaint   Patient presents with    Follow - Up     LOV 3/20/2024  Rapid 3 score is a 2.0  Patient states her hands are a little discolored at the moment but otherwise she is doing well.        ASSESSMENT, DISCUSSION & PLAN   Assessment:  1. Diffuse scleroderma (HCC)    2. Sclerodactyly    3. Raynaud's disease with gangrene (HCC)    4. Contracture, palmar fascia    5. Rheumatoid factor positive    6. Dyspnea, unspecified type    7. Degeneration of intervertebral disc of lumbar region, unspecified whether pain present          Discussion:  Ms. Brandy Hope is a 63 yo woman with long standing hx of diffuse scleroderma, initially diagnosed in 1999. At that time, she had skin tightening diffusely, polyarthralgia, myalgias as well as shortness of breath and raynauds. She initially took methotrexate but unclear details and thinks only took for 6 weeks. She was then placed on combination of plaquenil and minocycline with significant improvement of symptoms. She eventually weaned off medications without any worsened symptoms. She also took nifedipine for the raynauds. She does have hx of digital ulcers with residual digital pit seen over left index finger. She still has signs of raynauds. She also has signs of skin tightening over the fingers diffusely from tips to PIPs as well as her face particularly perioral. She has telangectasia, reflux and well as joint pain in her right knee which has been replaced.   At this time, she lacks signs of synovitis and pt is hesitant to take medication if can be avoided.  Discussed that she is starting to get signs of possible acro-osteolysis that we need to monitor her for.   Recommended we monitor her for worsened symptoms.  Will get updated PFTs and monitor annually   Reviewed prior CT of lungs which are stable and without signs of active ILD but has multiple nodules. Will repeat.   Last ECHO was in 2023 so will repeat  to monitor for signs of pulm HTN which were not present on last ECHO.   Previously ncouraged pt to consider restarting nifedipine for the raynauds.   Also strongly recommended dermatology evaluation as well as wearing proper sun protection due to the appearance of significant sun damage and crepe skin.     She had RF positivity through her PCP, unclear significance as she lacks signs of active RA on her exam. Repeat RF and inflammatory markers were negative/normal.  Scleroderma ab panel showed positive RP11/155 which can be associated with rapid skin fibrosis, renal crisis, GAVE and pts commonly found with cancer at time of dx.   GLG message sent with this information and article. Will monitor pt closely for above.     Of note, she does have continued low back pain. Expresses frustration with office since results not given. Encouraged to follow back with them and discuss MRI results further.     Also has early dupuytren's on right hand- will send ortho hand.     Okay for pt to follow up in 6 months or sooner as needed. If still doing well, will consider annual follow up, likely in winter months to monitor her raynaud's more closely.   Has plans for knee replacement in the next few months- cleared from rheum standpoint.     Patient verbalized understanding of above instructions. No further questions at this time.    Code selection for this visit was based on time spent (30min) on date of service in preparing to see the patient, obtaining and/or reviewing separately obtained history, performing a medically appropriate examination, counseling and educating the patient/family/caregiver, ordering medications or testing, referring and communicating with other healthcare providers, documenting clinical information in the E HR, independently interpreting results and communicating results to the patient/family/caregiver and care coordination with the patient's other providers.    Additional time spent reviewing images  with pt, discussing test results and gathering information and sending [employer] message.     ?  Plan:  Diagnoses and all orders for this visit:    Diffuse scleroderma (HCC)  -     Complete PFT; Future  -     CARD ECHO 2D DOPPLER (CPT=93306); Future  -     CT CHEST HI RESOLUTION (CPT=71250); Future    Sclerodactyly    Raynaud's disease with gangrene (HCC)  -     Complete PFT; Future  -     CARD ECHO 2D DOPPLER (CPT=93306); Future  -     CT CHEST HI RESOLUTION (CPT=71250); Future    Contracture, palmar fascia    Rheumatoid factor positive  -     Complete PFT; Future  -     CARD ECHO 2D DOPPLER (CPT=93306); Future  -     CT CHEST HI RESOLUTION (CPT=71250); Future    Dyspnea, unspecified type  -     Complete PFT; Future  -     CARD ECHO 2D DOPPLER (CPT=93306); Future  -     CT CHEST HI RESOLUTION (CPT=71250); Future    Degeneration of intervertebral disc of lumbar region, unspecified whether pain present            Return in about 6 months (around 7/8/2025).  ?  HPI   Brandy Hope is a 64 year old female with the following active problems who is referred for rheumatologic evaluation due to scleroderma who presents for follow up.     Has plans for left knee replacement in March.  Has been having some fluid retention and not taking her water pill regularly.  Is watching her diet but has had some weight gain.     Raynauds stable-- no recent ulcers (had possible one last year)   Feels the skin tightening is stable but has some flexion deformity over the right palm- feels slightly more pronounced.   + telangectasia over the arms- stable and no new lesions  + possible calficiations under feet- stable for quite some time   + reflux, takes esomeprazole in the mornings, tries to avoid taking at night without worsened symptoms  + occasional difficulty swallowing- stable. Depends on food like gummy/caramel consistency.     Denies any other joint pain at this time.  Chronic low back pain- did have MRI but never given results. Not  sure if she needs further surgery or not.        HPI from initial consultation  referred for rheumatologic evaluation due to scleroderma.     In 1999, noted some swelling and darker skin as well as some shortness of breath. Eventually developed myalgias and pain diffusely.  Dx with diffuse scleroderma, started on methotrexate, was frustrated with provider, took about 6 weeks and did not improvement of symptoms and stopped. No side effects.   Transferred care to different [facility] and recommended [facility]. Was seen at [place] in 1999, started on combination of plaquenil and minocycline. States it helped significantly with all her symptoms, felt near baseline by 6 months  Eventually weaned off all her medications for scleroderma, over 10 years ago, no major flare since the initial attack. Was on combination of therapies for over 10 years and tolerated.     + skin tightening over the fingers and over face/mouth (smaller aperture) (previously diffusely)  + raynaud's and hx of digital ulcers. Was previously on nifedipine, no longer taking.   + reflux, takes esomeprazole   + occasional difficulty swallowing  + hx of pulmonary nodules that have been stable (stable CT in April 2023)   Denies hx of pulmonary hypertension, last ECHO in Aug 2022.   + telangectasia over the arms    Can get some joint pain in the knees- hx of replacement on the right, worsened the past few days. Does follow with orthopedics for the left knee, had cortisone injection and considering another.   Get significant swelling with heat exposure.   Hx of plantar heel spur and knots under left foot   Hx of bunionectomy on left foot and fusion that still causes some pain.     + some hair thinning, not noticed by . Denies bald spots.   Admits to not wearing sun block regularly except on her face. Never saw dermatology.   + nail changes   + gum disease and hx of cavities     Hx of anemia requiring blood transfusion at time of knee replacement   Hx of  borderline thrombocytopenia   Hx of EGD/cscope, normal as far as pt knows.     No history of significant morning stiffness.  Denies skin calcifications  The patient denies oral or nasal ulcers, photosensitive rash, elevated or scarring rashes, prior hematologic abnormality, prior renal or liver disease.  Denies hx of pericarditis or pleuritis.   No history of prior blood clot in the legs or lungs, strokes or ischemic phenomenon.  The patient denies any history of uveitis, crampy abdominal pain, constipation, diarrhea, bloody stools, nodular painful shin bruises, Achilles heel pain.   There are no symptoms of severe dry eyes, dry mouth, or swelling of the cheeks or under the jawbone.   No fevers, chills, lymphadenopathy, night sweats, unexpected weight loss.  Denies chronic sinus infections/disease or epistaxis.  Denies chronic cough or hemoptysis.         Past Medical History:  Past Medical History:    Anemia, unspecified    Back problem    Chest pain, unspecified    Cough    Degeneration of lumbar or lumbosacral intervertebral disc    Degeneration of meniscus of right knee    Dysphagia, unspecified(787.20)    Esophageal reflux    Glucose intolerance (pre-diabetes)    High cholesterol    Lumbago    Neoplasm of uncertain behavior of skin    OTHER DISEASES    scleroderma    Other malaise and fatigue    Pure hypercholesterolemia    Scleroderma (HCC)    Visual impairment    glasses     Past Surgical History:  Past Surgical History:   Procedure Laterality Date    Back surgery      laminectomy ,     Back surgery  2015    Lumbar fusion, Dr. Palacio          x4    Colonoscopy  06    Colonoscopy N/A 3/13/2016    Procedure: COLONOSCOPY;  Surgeon: Di Blackwell DO;  Location:  ENDOSCOPY    Dexa,bone density,axial skeleton  2003    Endometrial ablation      Hysterectomy  05/03/10    TV, Rt. salpingectomy    Hysteroscopy,with endometrial  09    Performed by FABRIZIO SIM at [facility]    Knee replacement surgery Right 3/7/2016    Other surgical history  06    esophagogastroduodenoscopy    Other surgical history Left 2014    toe surgery-bunionectomy, fusion of toe     Family History:  Family History   Problem Relation Age of Onset    Heart Disease Father     Diabetes Father     Other (CABG [Other]) Father     Cancer Father         Lung    Heart Disease Mother     Heart Attack Mother         AMI     Social History:  Social History     Socioeconomic History    Marital status:    Tobacco Use    Smoking status: Former     Current packs/day: 0.00     Types: Cigarettes     Quit date: 1980     Years since quittin.5    Smokeless tobacco: Never   Vaping Use    Vaping status: Never Used   Substance and Sexual Activity    Alcohol use: Yes     Alcohol/week: 0.0 standard drinks of alcohol     Comment: 1 a week     Drug use: No    Sexual activity: Yes     Partners: Male   Other Topics Concern    Caffeine Concern Yes     Comment: 1-3 c daily     Exercise No     Social Drivers of Health     Food Insecurity: Low Risk  (10/17/2023)    Received from [facility]    Food Insecurity     Have there been times that your food ran out, and you didn't have money to get more?: No     Are there times that you worry that this might happen?: No   Transportation Needs: Low Risk  (10/17/2023)    Received from [facility]    Transportation Needs     Do you have trouble getting transportation to medical appointments?: No     How do you normally get to and from your appointments?: Other   Housing Stability: Low Risk  (10/17/2023)    Received from [facility]    Housing Stability     Are you concerned about having a safe and reliable place to live?: No   Recent Concern: Housing Stability - At Risk (2023)    Received from  [facility]     Medications:  Outpatient Medications Marked as Taking for the 1/8/25 encounter (Office Visit) with Iza June,    Medication Sig Dispense Refill    Esomeprazole Magnesium 40 MG Oral Capsule Delayed Release Take 1 capsule (40 mg total) by mouth 2 (two) times daily. 60 capsule 0    HYDROCHLOROTHIAZIDE 25 MG Oral Tab TAKE 1 TABLET(25 MG) BY MOUTH TWICE DAILY FOR DIURETIC 180 tablet 0    ATORVASTATIN 40 MG Oral Tab TAKE 1 TABLET(40 MG) BY MOUTH EVERY NIGHT 90 tablet 0    Zinc 50 MG Oral Cap Take by mouth.      Ascorbic Acid (VITAMIN C) 1000 MG Oral Tab Take 2 tablets (2,000 mg total) by mouth daily.      triamcinolone acetonide 0.1 % External Ointment Apply 1 Application topically 2 (two) times daily. 80 g 1    Hyaluronic Acid-Vitamin C (HYALURONIC ACID OR) Take 100 mg by mouth.      Omega-3 Fatty Acids (FISH OIL) 1200 MG Oral Capsule Delayed Release Take by mouth.      Aloe Oral Liquid Take by mouth. 4 ounces daily      Multiple Vitamins-Minerals (MULTIVITAMIN OR) Take  by mouth daily.       Modified Medications    No medications on file     There are no discontinued medications.  ?  Allergies:  No Known Allergies    ?  REVIEW OF SYSTEMS   ?  Review of Systems   Constitutional:  Positive for malaise/fatigue. Negative for chills, fever and weight loss.   Eyes:  Negative for blurred vision (floaters have resolved), pain and redness.   Respiratory:  Negative for cough and shortness of breath.    Cardiovascular:  Positive for leg swelling (stable, worsens as day progress). Negative for chest pain and palpitations.   Gastrointestinal:  Negative for abdominal pain, blood in stool, constipation, diarrhea and heartburn (controlled).   Genitourinary:  Negative for dysuria, frequency, hematuria and urgency.   Musculoskeletal:  Positive for back pain and joint pain. Negative for myalgias and neck pain.   Skin:  Negative for itching and rash.   Neurological:  Positive for tingling  (with raynauds as well as back/leg). Negative for dizziness, seizures, weakness and headaches.   Endo/Heme/Allergies:  Negative for environmental allergies. Bruises/bleeds easily (not as bad as previously).   Psychiatric/Behavioral:  Negative for depression. The patient is not nervous/anxious and does not have insomnia.      PHYSICAL EXAM   Today's Vitals:  Temperature Blood Pressure Heart Rate Resp Rate SpO2   Temp: 97.7 °F (36.5 °C) BP: 140/82 Pulse: 83 Resp: 16 SpO2: 98 %   ?  Current Weight Height BMI BSA Pain   Wt Readings from Last 1 Encounters:   01/09/25 187 lb (84.8 kg)    Height: 5' 4.5\" (163.8 cm) Body mass index is 31.64 kg/m². Body surface area is 1.91 meters squared.         Physical Exam  Vitals and nursing note reviewed.   Constitutional:       General: She is not in acute distress.     Appearance: She is well-developed. She is not diaphoretic.   HENT:      Head: Normocephalic.   Eyes:      General: No scleral icterus.     Extraocular Movements: Extraocular movements intact.      Conjunctiva/sclera: Conjunctivae normal.   Neck:      Vascular: No JVD.      Trachea: No tracheal deviation.   Cardiovascular:      Rate and Rhythm: Normal rate and regular rhythm.      Heart sounds: Normal heart sounds. No murmur heard.  Pulmonary:      Effort: Pulmonary effort is normal. No respiratory distress.      Breath sounds: Normal breath sounds. No wheezing.   Musculoskeletal:         General: Deformity present. No tenderness.      Cervical back: Normal range of motion and neck supple.      Comments: Chronic flexion deformity over left middle finger and right pinky finger.   Bony enlargement without tenderness over left middle PIP   No swelling, tenderness, redness or restriction of motion of the DIPs, MCPs, wrists, elbows, ankles, or joints of the feet.  Bilateral shoulders with full ROM, no evidence of impingement with provocative maneuvers.  Right knee post surgical, no obvious effusion, redness or warmth. Left  knee with crepitus.   Right nodule palpated over palmar tendon forming early duputryen contracture    Lymphadenopathy:      Cervical: No cervical adenopathy.   Skin:     General: Skin is warm and dry.      Findings: No rash.      Comments: Diffuse skin tightening over fingers from tips to PIPs   Diffuse periungal erythema and drop off sign noted  Scarring/digital pit over index finger from prior ulcer  Some skin tightening over face, mouth aperture of just less than 3 of pt's fingers   Scattered telangectasia over hands and face  Skin with salt/pepper/sun damage appearance and crepe skin diffusely (except finger tips)  Some tightening at tips of toes on right    Neurological:      Mental Status: She is alert and oriented to person, place, and time.      Cranial Nerves: No cranial nerve deficit.      Gait: Gait normal.   Psychiatric:         Mood and Affect: Mood normal.         Behavior: Behavior normal.       ?  Radiology review:      Narrative   PROCEDURE:  XR LUMBAR SPINE COMPLETE W/FLEX + EXT (CPT=72114)     TECHNIQUE:  AP, lateral, obliques, coned down view, and flexion/extension views of the spine were obtained.     COMPARISON:  [facility] , XR, XR LUMBAR SPINE (MIN 2 VIEWS) (CPT=72100), 9/28/2016, 6:06 PM.     INDICATIONS:  M54.50 Lumbar back pain Z98.1 S/P lumbar fusion     PATIENT STATED HISTORY: (As transcribed by Technologist)  Patient had a lumbar fusion in 2015. For about 1 year she has been having lower back pain and tightness.         FINDINGS:    Posterior rods and inter pedicular screws are noted L3-4.  There is approximately 1.6 cm anterior subluxation of L4 on L5 which is unchanged from previous exam.  Severe disc space narrowing is seen at L4-5 and L5-S1.  The metallic orthopedic   hardware appears intact.  There is no new fracture or dislocation.  There is anterior endplate spurring noted at L4-5 and L5-S1.  There is facet arthropathy again noted throughout the lumbar spine which does not appear  significantly changed.     The osseous structures appear demineralized.  There is joint space narrowing and some spurring involving the SI joints bilaterally without ankylosis.     Atherosclerotic arterial vascular calcifications are seen in the aorta and aortic branches.       Impression   CONCLUSION:  No significant interval change.  Please see above for details.        LOCATION:  [facility]        Dictated by (CST): Karri Cook MD on 2/12/2024 at 6:28 PM      Finalized by (CST): Karri Cook MD on 2/12/2024 at 6:31 PM       Exam: CT CHEST W CONTRAST   CPT Code(s): 59240,-608,-981 - CT THORAX W/DYE,Omnipaque 300 100mL vial,Omnipaque 350 100 mL vial     INDICATION:  Lung nodule (<= 8 mm) seen on outside chest radiograph. No current chest complaints.     COMPARISON:  6/20/2022 chest CTA study. 6/20/2022 chest radiograph.     TECHNIQUE: Routine CT exam of the thorax performed with 80 cc of intravenous Omnipaque 300.  Sagittal and coronal reconstructed images.  This study was performed on a Siemens Somatom Go CT scanner with both CARE Dose 4D tube modulation and SAFIRE   iterative reconstruction for radiation dose reduction.     FINDINGS:   CHEST: There is a small 5 mm hypodense nodule within the right thyroid lobe (series 2, image 3). There is no evidence of mediastinal, hilar, or axillary lymphadenopathy. The central airways are widely patent.  The thoracic aorta is normal in caliber   with mild atherosclerotic vascular calcifications. Heart size is within normal limits.     There is no evidence of pleural fluid or pleural thickening extends. Prior granulomatous disease is again identified with small calcified granulomata within the lungs bilaterally, unchanged. A small 5 mm noncalcified right upper lobe nodule is identified    in the upper hilar region series 4, image 27 and series 10, which 50). This is not significant changed.  There is stable mild subpleural nodularity along the lateral aspect  of the right upper lobe and the posterolateral aspect of the right lower lobe.   This likely represents postinflammatory changes. The lungs are otherwise clear with no focal airspace consolidation.     UPPER ABDOMINAL STRUCTURES: There is a stable 1.7 cm water density simple cyst within the left lateral hepatic segment (series 2, images 5-90). The partially visualized upper abdominal structures are otherwise unremarkable. Mild thoracic spondylosis is   identified with a mild thoracic dextrocurvature. The visualized thoracic bone structures are otherwise unremarkable.     IMPRESSION:   1. Redemonstrated prior granulomatous disease. Subpleural nodularity along the lateral aspect of the right upper lobe and in the posterolateral aspect of the right lower lobe are nonspecific, likely representing postoperative changes. This is not   significantly changed.   2. Stable 5 mm noncalcified nodule within the right upper lobe adjacent to the upper right hilum. An additional 12 month follow-up study should be considered to confirm ongoing stability.   3. No evidence of intrathoracic lymphadenopathy.   4. Small subcentimeter hypodense nodule within the right thyroid lobe. No specific imaging follow-up is recommended for a small nodule of this size. (Recommendations based on Managing Incidental Thyroid Nodules Detected on Imaging: White Paper of the ACR    Incidental Thyroid Findings Committee. JACR 2015;12:208402.)     Interpreting Radiologist:     Maximiliano Moseley M.D.   Electronically Signed: 04/04/2023 09:09 AM     Labs:  Lab Results   Component Value Date    WBC 5.6 07/16/2024    RBC 4.86 07/16/2024    HGB 14.4 07/16/2024    HCT 44.2 07/16/2024    .0 07/16/2024    MPV 9.4 12/13/2012    MCV 90.9 07/16/2024    MCH 29.6 07/16/2024    MCHC 32.6 07/16/2024    RDW 12.4 07/16/2024    NEPRELIM 3.04 07/16/2024    NEPERCENT 53.9 07/16/2024    LYPERCENT 30.5 07/16/2024    MOPERCENT 9.0 07/16/2024    EOPERCENT 5.0 07/16/2024     BAPERCENT 1.2 07/16/2024    NE 3.04 07/16/2024    LYMABS 1.72 07/16/2024    MOABSO 0.51 07/16/2024    EOABSO 0.28 07/16/2024    BAABSO 0.07 07/16/2024     Lab Results   Component Value Date    GLU 91 07/16/2024    BUN 7 (L) 07/16/2024    BUNCREA 10.9 11/04/2020    CREATSERUM 0.69 07/16/2024    ANIONGAP 7 07/16/2024     03/31/2017    GFRNAA 97 06/20/2022    GFRAA 112 06/20/2022    CA 9.6 07/16/2024    OSMOCALC 292 07/16/2024    ALKPHO 64 07/16/2024    AST 29 07/16/2024    ALT 20 07/16/2024    BILT 0.9 07/16/2024    TP 7.0 07/16/2024    ALB 4.3 07/16/2024    GLOBULIN 2.7 (L) 07/16/2024    AGRATIO 1.9 06/07/2011     07/16/2024    K 4.4 07/16/2024     07/16/2024    CO2 30.0 07/16/2024       Additional Labs:  10/2023  Conclusions:   1. Left ventricle: The cavity size was normal. Wall thickness was normal.      Systolic function was normal. The estimated ejection fraction was 60-65%.      No diagnostic evidence for regional wall motion abnormalities. Left      ventricular diastolic function parameters were normal for the patient's      age.   2. Left atrium: The left atrial volume was upper normal.   3. Mitral valve: There was mild regurgitation.   4. Right ventricle: The cavity size was normal. Systolic function was      normal.   5. Pulmonary arteries: The main pulmonary artery was dilated. Systolic      pressure was within the normal range, estimated to be 27mm Hg. Estimated      pulmonary artery diastolic pressure was 8mm Hg.   Impressions:  This study is compared with previous dated 08/29/2022: Similar   findings.     09/2023  Pulmonary Function Test:     Findings:  Spirometry: FEV1 is 2.18 L, 92% predicted. FVC is 2.73 L, 90% predicted and FEV1/ FVC ratio is 0.80  The flow-volume loop demonstrates a normal pattern. MVV is 78, 87%predicted  Lung Volumes:                                                                                                              The TLC is 5.35 L, 108%  predicted.  The residual volume 2.62 L, 133% predicted.  Diffusion Capacity:  The diffusion capacity is 16.04 or 81% predicted and 93% predicted when corrected for alveolar volume.     Impression:  There is no airway obstruction on spirometry and visualized on flow-volume loop.    Lung volumes show normal range total lung capacity.  Although residual volume is increased.  Despite the absence of airway obstruction, there is evidence of air trapping (residual volume of 2.62 L, 133% predicted).    Diffusion capacity is normal range.  when considering alveolar volume.   There are no previous pulmonary function tests available for comparison.         Jhonny Sales MD          08/2022  Conclusions:   1. Left ventricle: The cavity size was normal. Wall thickness was at the      upper limits of normal. Systolic function was normal. The estimated      ejection fraction was 55-60%. No diagnostic evidence for regional wall      motion abnormalities. Left ventricular diastolic function parameters were      normal.   2. Mitral valve: There was mild regurgitation.   3. Pulmonary arteries: Systolic pressure was within the normal range,      estimated to be 27mm Hg.   Impressions:  This study is compared with previous dated 08/07/2019: Prior   echocardiography showed mildly elevated PASP. Otherwise, similar findings.     09/2023  RF negative  ESR normal  CRP normal  Scleroderma panel: RP11/155 positive    03/2023  ALVARO screen negative  dsDNA negative  Vitamin D 40.4 normal  CRP normal ESR normal  CCP negative  Uric acid 5.6 normal  RF 22 borderline positive    Iza June DO  [facility]  01/08/2025

## 2025-01-09 ENCOUNTER — OFFICE VISIT (OUTPATIENT)
Dept: FAMILY MEDICINE CLINIC | Facility: CLINIC | Age: 65
End: 2025-01-09
Payer: COMMERCIAL

## 2025-01-09 VITALS
HEIGHT: 64.5 IN | TEMPERATURE: 97 F | BODY MASS INDEX: 31.54 KG/M2 | HEART RATE: 76 BPM | SYSTOLIC BLOOD PRESSURE: 138 MMHG | DIASTOLIC BLOOD PRESSURE: 86 MMHG | RESPIRATION RATE: 16 BRPM | WEIGHT: 187 LBS

## 2025-01-09 DIAGNOSIS — E78.00 HYPERCHOLESTEROLEMIA: ICD-10-CM

## 2025-01-09 DIAGNOSIS — R03.0 ELEVATED BLOOD PRESSURE READING: ICD-10-CM

## 2025-01-09 PROCEDURE — 3079F DIAST BP 80-89 MM HG: CPT | Performed by: FAMILY MEDICINE

## 2025-01-09 PROCEDURE — 3075F SYST BP GE 130 - 139MM HG: CPT | Performed by: FAMILY MEDICINE

## 2025-01-09 PROCEDURE — G2211 COMPLEX E/M VISIT ADD ON: HCPCS | Performed by: FAMILY MEDICINE

## 2025-01-09 PROCEDURE — 3008F BODY MASS INDEX DOCD: CPT | Performed by: FAMILY MEDICINE

## 2025-01-09 PROCEDURE — 99214 OFFICE O/P EST MOD 30 MIN: CPT | Performed by: FAMILY MEDICINE

## 2025-01-09 RX ORDER — ATORVASTATIN CALCIUM 40 MG/1
40 TABLET, FILM COATED ORAL NIGHTLY
Qty: 90 TABLET | Refills: 0 | Status: CANCELLED
Start: 2025-01-09

## 2025-01-09 NOTE — PROGRESS NOTES
[facility] Office Note  Chief Complaint:   Chief Complaint   Patient presents with    Follow - Up       HPI:   This is a 64 year old female coming in for medication follow-up.  The patient states that she has been consistently taking her cholesterol medication.  She states that she has been trying to modify her diet.  She will be having surgery soon in March for her knee.  States that she has not been taking her hydrochlorothiazide consistently she has been using this for lower extremity swelling.  She denies any heart palpitations chest pain.      Past Medical History:    Anemia, unspecified    Back problem    Chest pain, unspecified    Cough    Degeneration of lumbar or lumbosacral intervertebral disc    Degeneration of meniscus of right knee    Dysphagia, unspecified(787.20)    Esophageal reflux    Glucose intolerance (pre-diabetes)    High cholesterol    Lumbago    Neoplasm of uncertain behavior of skin    OTHER DISEASES    scleroderma    Other malaise and fatigue    Pure hypercholesterolemia    Scleroderma (HCC)    Visual impairment    glasses     Past Surgical History:   Procedure Laterality Date    Back surgery      laminectomy ,     Back surgery  2015    Lumbar fusion, Dr. Palacio          x4    Colonoscopy  06    Colonoscopy N/A 3/13/2016    Procedure: COLONOSCOPY;  Surgeon: Di Blackwell DO;  Location:  ENDOSCOPY    Dexa,bone density,axial skeleton  2003    Endometrial ablation      Hysterectomy  05/03/10    TV, Rt. salpingectomy    Hysteroscopy,with endometrial  09    Performed by FABRIZIO SIM at [facility]    Knee replacement surgery Right 3/7/2016    Other surgical history  06    esophagogastroduodenoscopy    Other surgical history Left 2014    toe surgery-bunionectomy, fusion of toe     Social History:  Social History     Socioeconomic History    Marital status:    Tobacco Use    Smoking status:  Former     Current packs/day: 0.00     Types: Cigarettes     Quit date: 1980     Years since quittin.5    Smokeless tobacco: Never   Vaping Use    Vaping status: Never Used   Substance and Sexual Activity    Alcohol use: Yes     Alcohol/week: 0.0 standard drinks of alcohol     Comment: 1 a week     Drug use: No    Sexual activity: Yes     Partners: Male   Other Topics Concern    Caffeine Concern Yes     Comment: 1-3 c daily     Exercise No     Social Drivers of Health     Food Insecurity: Low Risk  (10/17/2023)    Received from [facility]    Food Insecurity     Have there been times that your food ran out, and you didn't have money to get more?: No     Are there times that you worry that this might happen?: No   Transportation Needs: Low Risk  (10/17/2023)    Received from [facility]    Transportation Needs     Do you have trouble getting transportation to medical appointments?: No     How do you normally get to and from your appointments?: Other   Housing Stability: Low Risk  (10/17/2023)    Received from [facility]    Housing Stability     Are you concerned about having a safe and reliable place to live?: No   Recent Concern: Housing Stability - At Risk (2023)    [facility]     Family History:  Family History   Problem Relation Age of Onset    Heart Disease Father     Diabetes Father     Other (CABG [Other]) Father     Cancer Father         Lung    Heart Disease Mother     Heart Attack Mother         AMI     Allergies:  Allergies[1]  Current Meds:  Current Outpatient Medications   Medication Sig Dispense Refill    Esomeprazole Magnesium 40 MG Oral Capsule Delayed Release Take 1 capsule (40 mg total) by mouth 2 (two) times daily. 60 capsule 0    HYDROCHLOROTHIAZIDE 25 MG Oral Tab TAKE 1  TABLET(25 MG) BY MOUTH TWICE DAILY FOR DIURETIC 180 tablet 0    ATORVASTATIN 40 MG Oral Tab TAKE 1 TABLET(40 MG) BY MOUTH EVERY NIGHT 90 tablet 0    Zinc 50 MG Oral Cap Take by mouth.      Ascorbic Acid (VITAMIN C) 1000 MG Oral Tab Take 2 tablets (2,000 mg total) by mouth daily.      triamcinolone acetonide 0.1 % External Ointment Apply 1 Application topically 2 (two) times daily. 80 g 1    Hyaluronic Acid-Vitamin C (HYALURONIC ACID OR) Take 100 mg by mouth.      Omega-3 Fatty Acids (FISH OIL) 1200 MG Oral Capsule Delayed Release Take by mouth.      Aloe Oral Liquid Take by mouth. 4 ounces daily      Multiple Vitamins-Minerals (MULTIVITAMIN OR) Take  by mouth daily.        Counseling given: Not Answered       REVIEW OF SYSTEMS:   Consitutional: No fevers, chills, sweats  Eye: No recent visual problems  ENMT: No ear pain nasal congestion sore throat  Respiratory: No shortness of breath, cough  Cardiovascular: No chest pain palpitations syncope  Gastrointestinal: No nausea vomiting diarrhea  Genitourinary: No hematuria  Hema/Lymph no bruising tendency, swollen lymph glands       Medical, surgical, family, and social histories were reviewed      EXAM:   VITALS:   Vitals:    01/09/25 0724   BP: 138/86   Pulse:    Resp:    Temp:       GENERAL: well developed, well nourished, in no apparent distress  SKIN: no rashes, no suspicious lesions: Cool and Dry  HEENT: atraumatic, normocephalic, ears and throat are clear    Ears: TM's clear and visible bilaterally, no excess cerumen or erythema.   EYES: Pupils equal round and reactive.  Extraocular motions intact no scleral icterus no injection or drainage  THROAT without erythema tonsillar hypertrophy or exudate.  Uvula midline airway patent  NECK: Given midline.  No JVD or lymphadenopathy supple nontender no meningeal signs   LUNGS: clear to auscultation sounds equal bilaterally no wheezes rales or rhonchi  CARDIO: Regular rate and rhythm without murmurs gallops or rubs           ASSESSMENT AND PLAN:   1. Hypercholesterolemia  I did discuss with the patient her LDL was extremely elevated and at this point I did discuss with her that she needs to be more consistent with her medications.  We will be waiting to repeat her blood work closer to her surgery she was asked to start taking the atorvastatin as prescribed.  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  2. Elevated blood pressure reading  Patient's blood pressure is elevated today recheck did come down but I did ask her to check her blood pressures at home twice a day and send me through [employer] for evaluation.    Meds & Refills for this Visit:  Requested Prescriptions      No prescriptions requested or ordered in this encounter       Health Maintenance:  Health Maintenance Due   Topic Date Due    Pneumococcal Vaccine: 50+ Years (1 of 1 - PCV) Never done    Zoster Vaccines (1 of 2) Never done    COVID-19 Vaccine (1 - 2024-25 season) Never done    Influenza Vaccine (1) 10/01/2024    Annual Depression Screening  01/01/2025    Fall Risk Screening (Annual)  01/01/2025       Patient/Caregiver Education: Patient/Caregiver Education: There are no barriers to learning. Medical education done.   Outcome: Patient verbalizes understanding. Patient is notified to call with any questions, complications, allergies, or worsening or changing symptoms.  Patient is to call with any side effects or complications from the treatments as a result of today.     Problem List:  Patient Active Problem List   Diagnosis    Diffuse scleroderma (HCC)    Reflux esophagitis    Pure hypercholesterolemia    Raynaud's syndrome    Neck pain, bilateral    Acquired spondylolisthesis    Brachial neuritis or radiculitis NOS    Rash and nonspecific skin eruption    Tear of medial cartilage or meniscus of knee, current              GLOBAL EXP 12-24-14 / Right Knee / Ilia    Osteoarthrosis,  unspecified whether generalized or localized, lower leg    Pre-operative clearance    Lumbar stenosis with neurogenic claudication    Cervical radiculopathy    At risk for falling    Status post right knee replacement    Vitamin D deficiency    Palpitations    Bilateral edema of lower extremity    Weight gain    Exertional dyspnea    Chest heaviness    Other fatigue    Post viral syndrome    Sclerodactyly    Contracture, palmar fascia         No follow-ups on file.     Corey Gonzalez MD    Please note that portions of this note may have been completed with a voice recognition program. Efforts were made to edit the dictations but occasionally words are mis-transcribed.       [1] No Known Allergies

## 2025-01-21 ENCOUNTER — RT VISIT (OUTPATIENT)
Dept: RESPIRATORY THERAPY | Facility: HOSPITAL | Age: 65
End: 2025-01-21
Attending: INTERNAL MEDICINE
Payer: COMMERCIAL

## 2025-01-21 ENCOUNTER — HOSPITAL ENCOUNTER (OUTPATIENT)
Dept: CV DIAGNOSTICS | Facility: HOSPITAL | Age: 65
Discharge: HOME | End: 2025-01-21
Attending: INTERNAL MEDICINE
Payer: COMMERCIAL

## 2025-01-21 ENCOUNTER — HOSPITAL ENCOUNTER (OUTPATIENT)
Dept: CT IMAGING | Facility: HOSPITAL | Age: 65
Discharge: HOME | End: 2025-01-21
Attending: INTERNAL MEDICINE
Payer: COMMERCIAL

## 2025-01-21 DIAGNOSIS — R06.00 DYSPNEA, UNSPECIFIED TYPE: ICD-10-CM

## 2025-01-21 DIAGNOSIS — R76.8 RHEUMATOID FACTOR POSITIVE: ICD-10-CM

## 2025-01-21 DIAGNOSIS — M34.9 DIFFUSE SCLERODERMA (HCC): ICD-10-CM

## 2025-01-21 DIAGNOSIS — I73.01 RAYNAUD'S DISEASE WITH GANGRENE (HCC): ICD-10-CM

## 2025-01-21 PROCEDURE — 93306 TTE W/DOPPLER COMPLETE: CPT | Performed by: INTERNAL MEDICINE

## 2025-01-21 PROCEDURE — 71250 CT THORAX DX C-: CPT | Performed by: INTERNAL MEDICINE

## 2025-01-24 PROCEDURE — 94060 EVALUATION OF WHEEZING: CPT | Performed by: INTERNAL MEDICINE

## 2025-01-24 PROCEDURE — 94726 PLETHYSMOGRAPHY LUNG VOLUMES: CPT | Performed by: INTERNAL MEDICINE

## 2025-01-24 PROCEDURE — 94729 DIFFUSING CAPACITY: CPT | Performed by: INTERNAL MEDICINE

## 2025-01-24 NOTE — PROCEDURES
Patient is a 64-year-old female being assessed with a diagnosis of scleroderma.    Results    FEV1 2.02 L normal at -0.58 Z-score  FVC 2.57 L normal at a Z-score -0.58  Ratio was normal at 79%  MVV is normal 99% of predicted  Flow volume loop without specific abnormality    Total lung capacity 5.02 L normal at -0.03 100% of predicted  Residual volume 2.54 L normal at 1.11  Diffusion capacity is normal -0.74.  When corrected for alveolar volume it improves to -0.49 remains within the normal range.    Impression--there is no evidence of airways obstruction or restrictive defect and diffusion capacity is normal.    When compared to a study from 9/27/2023, there is been a less than 200 cc drop in FEV1 and FVC.  Total lung capacity has decreased approximately 300 cc but remains well within the normal range.  Diffusion capacity remains normal and unchanged.

## 2025-02-04 DIAGNOSIS — K21.00 REFLUX ESOPHAGITIS: ICD-10-CM

## 2025-02-06 RX ORDER — ESOMEPRAZOLE MAGNESIUM 40 MG/1
40 CAPSULE, DELAYED RELEASE ORAL 2 TIMES DAILY
Qty: 60 CAPSULE | Refills: 0 | Status: SHIPPED | OUTPATIENT
Start: 2025-02-06

## 2025-02-27 ENCOUNTER — LAB ENCOUNTER (OUTPATIENT)
Dept: LAB | Age: 65
End: 2025-02-27
Attending: FAMILY MEDICINE
Payer: COMMERCIAL

## 2025-02-27 ENCOUNTER — EKG ENCOUNTER (OUTPATIENT)
Dept: LAB | Age: 65
End: 2025-02-27
Attending: FAMILY MEDICINE
Payer: COMMERCIAL

## 2025-02-27 DIAGNOSIS — Z01.818 PRE-OP TESTING: ICD-10-CM

## 2025-02-27 DIAGNOSIS — E78.00 HYPERCHOLESTEROLEMIA: ICD-10-CM

## 2025-02-27 LAB
ALBUMIN SERPL-MCNC: 4.4 G/DL (ref 3.2–4.8)
ALBUMIN/GLOB SERPL: 1.8 {RATIO} (ref 1–2)
ALP LIVER SERPL-CCNC: 69 U/L
ALT SERPL-CCNC: 24 U/L
ANION GAP SERPL CALC-SCNC: 3 MMOL/L (ref 0–18)
AST SERPL-CCNC: 28 U/L (ref ?–34)
ATRIAL RATE: 79 BPM
BASOPHILS # BLD AUTO: 0.06 X10(3) UL (ref 0–0.2)
BASOPHILS NFR BLD AUTO: 1.1 %
BILIRUB SERPL-MCNC: 0.4 MG/DL (ref 0.2–1.1)
BUN BLD-MCNC: 11 MG/DL (ref 9–23)
CALCIUM BLD-MCNC: 9.7 MG/DL (ref 8.7–10.6)
CHLORIDE SERPL-SCNC: 102 MMOL/L (ref 98–112)
CHOLEST SERPL-MCNC: 264 MG/DL (ref ?–200)
CO2 SERPL-SCNC: 35 MMOL/L (ref 21–32)
CREAT BLD-MCNC: 0.72 MG/DL
EGFRCR SERPLBLD CKD-EPI 2021: 93 ML/MIN/1.73M2 (ref 60–?)
EOSINOPHIL # BLD AUTO: 0.38 X10(3) UL (ref 0–0.7)
EOSINOPHIL NFR BLD AUTO: 6.8 %
ERYTHROCYTE [DISTWIDTH] IN BLOOD BY AUTOMATED COUNT: 12.8 %
EST. AVERAGE GLUCOSE BLD GHB EST-MCNC: 105 MG/DL (ref 68–126)
FASTING PATIENT LIPID ANSWER: YES
FASTING STATUS PATIENT QL REPORTED: YES
GLOBULIN PLAS-MCNC: 2.5 G/DL (ref 2–3.5)
GLUCOSE BLD-MCNC: 88 MG/DL (ref 70–99)
HBA1C MFR BLD: 5.3 % (ref ?–5.7)
HCT VFR BLD AUTO: 42.9 %
HDLC SERPL-MCNC: 75 MG/DL (ref 40–59)
HGB BLD-MCNC: 14.3 G/DL
IMM GRANULOCYTES # BLD AUTO: 0.02 X10(3) UL (ref 0–1)
IMM GRANULOCYTES NFR BLD: 0.4 %
LDLC SERPL CALC-MCNC: 175 MG/DL (ref ?–100)
LYMPHOCYTES # BLD AUTO: 1.49 X10(3) UL (ref 1–4)
LYMPHOCYTES NFR BLD AUTO: 26.8 %
MCH RBC QN AUTO: 30.6 PG (ref 26–34)
MCHC RBC AUTO-ENTMCNC: 33.3 G/DL (ref 31–37)
MCV RBC AUTO: 91.7 FL
MONOCYTES # BLD AUTO: 0.46 X10(3) UL (ref 0.1–1)
MONOCYTES NFR BLD AUTO: 8.3 %
NEUTROPHILS # BLD AUTO: 3.16 X10 (3) UL (ref 1.5–7.7)
NEUTROPHILS # BLD AUTO: 3.16 X10(3) UL (ref 1.5–7.7)
NEUTROPHILS NFR BLD AUTO: 56.6 %
NONHDLC SERPL-MCNC: 189 MG/DL (ref ?–130)
OSMOLALITY SERPL CALC.SUM OF ELEC: 289 MOSM/KG (ref 275–295)
P AXIS: 44 DEGREES
P-R INTERVAL: 150 MS
PLATELET # BLD AUTO: 226 10(3)UL (ref 150–450)
POTASSIUM SERPL-SCNC: 4.1 MMOL/L (ref 3.5–5.1)
PROT SERPL-MCNC: 6.9 G/DL (ref 5.7–8.2)
Q-T INTERVAL: 376 MS
QRS DURATION: 100 MS
QTC CALCULATION (BEZET): 431 MS
R AXIS: 26 DEGREES
RBC # BLD AUTO: 4.68 X10(6)UL
SODIUM SERPL-SCNC: 140 MMOL/L (ref 136–145)
T AXIS: 42 DEGREES
TRIGL SERPL-MCNC: 84 MG/DL (ref 30–149)
VENTRICULAR RATE: 79 BPM
VLDLC SERPL CALC-MCNC: 17 MG/DL (ref 0–30)
WBC # BLD AUTO: 5.6 X10(3) UL (ref 4–11)

## 2025-02-27 PROCEDURE — 93005 ELECTROCARDIOGRAM TRACING: CPT

## 2025-02-27 PROCEDURE — 80053 COMPREHEN METABOLIC PANEL: CPT | Performed by: FAMILY MEDICINE

## 2025-02-27 PROCEDURE — 83036 HEMOGLOBIN GLYCOSYLATED A1C: CPT | Performed by: FAMILY MEDICINE

## 2025-02-27 PROCEDURE — 85025 COMPLETE CBC W/AUTO DIFF WBC: CPT | Performed by: FAMILY MEDICINE

## 2025-02-27 PROCEDURE — 93010 ELECTROCARDIOGRAM REPORT: CPT | Performed by: INTERNAL MEDICINE

## 2025-02-27 PROCEDURE — 80061 LIPID PANEL: CPT | Performed by: FAMILY MEDICINE

## 2025-02-28 ENCOUNTER — OFFICE VISIT (OUTPATIENT)
Dept: FAMILY MEDICINE CLINIC | Facility: CLINIC | Age: 65
End: 2025-02-28
Payer: COMMERCIAL

## 2025-02-28 VITALS
HEIGHT: 64.5 IN | BODY MASS INDEX: 30.04 KG/M2 | OXYGEN SATURATION: 99 % | SYSTOLIC BLOOD PRESSURE: 130 MMHG | DIASTOLIC BLOOD PRESSURE: 80 MMHG | RESPIRATION RATE: 16 BRPM | TEMPERATURE: 98 F | WEIGHT: 178.13 LBS | HEART RATE: 90 BPM

## 2025-02-28 DIAGNOSIS — E78.00 PURE HYPERCHOLESTEROLEMIA: ICD-10-CM

## 2025-02-28 DIAGNOSIS — Z01.818 PREOP GENERAL PHYSICAL EXAM: ICD-10-CM

## 2025-02-28 DIAGNOSIS — R94.31 ABNORMAL EKG: ICD-10-CM

## 2025-02-28 PROCEDURE — 3079F DIAST BP 80-89 MM HG: CPT | Performed by: FAMILY MEDICINE

## 2025-02-28 PROCEDURE — 3008F BODY MASS INDEX DOCD: CPT | Performed by: FAMILY MEDICINE

## 2025-02-28 PROCEDURE — 99214 OFFICE O/P EST MOD 30 MIN: CPT | Performed by: FAMILY MEDICINE

## 2025-02-28 PROCEDURE — 3075F SYST BP GE 130 - 139MM HG: CPT | Performed by: FAMILY MEDICINE

## 2025-02-28 PROCEDURE — G2211 COMPLEX E/M VISIT ADD ON: HCPCS | Performed by: FAMILY MEDICINE

## 2025-02-28 RX ORDER — LOVASTATIN 40 MG/1
40 TABLET ORAL NIGHTLY
Qty: 90 TABLET | Refills: 0 | Status: SHIPPED | OUTPATIENT
Start: 2025-02-28 | End: 2025-05-29

## 2025-03-01 NOTE — PROGRESS NOTES
[facility] Family Medicine Office Note  Chief Complaint:   Chief Complaint   Patient presents with    Pre-Op Exam     Surgery on 3/18/25 with Dr. Olmstead for left CESAR at [facility].        HPI:   This is a 64 year old female coming in for preop physical.  The patient will be undergoing total left knee replacement.  She denies any issues with anesthesia denies any bleeding disorders with herself or family denies any chest pain nausea vomiting diarrhea constipation.      Past Medical History:    Anemia, unspecified    Back problem    Chest pain, unspecified    Cough    Degeneration of lumbar or lumbosacral intervertebral disc    Degeneration of meniscus of right knee    Dysphagia, unspecified(787.20)    Esophageal reflux    Glucose intolerance (pre-diabetes)    High cholesterol    Lumbago    Neoplasm of uncertain behavior of skin    OTHER DISEASES    scleroderma    Other malaise and fatigue    Pure hypercholesterolemia    Scleroderma (HCC)    Visual impairment    glasses     Past Surgical History:   Procedure Laterality Date    Back surgery      laminectomy ,     Back surgery  2015    Lumbar fusion, Dr. Palacio          x4    Colonoscopy  06    Colonoscopy N/A 3/13/2016    Procedure: COLONOSCOPY;  Surgeon: Di Blackwell DO;  Location:  ENDOSCOPY    Dexa,bone density,axial skeleton  2003    Endometrial ablation      Hysterectomy  05/03/10    TVH, Rt. salpingectomy    Hysteroscopy,with endometrial  09    Performed by F[facility]RIZIO SIM [facility] [place]    Knee replacement surgery Right 3/7/2016    Other surgical history  06    esophagogastroduodenoscopy    Other surgical history Left 2014    toe surgery-bunionectomy, fusion of toe     Social History:  Social History     Socioeconomic History    Marital status:    Tobacco Use    Smoking status: Former     Current packs/day: 0.00     Types: Cigarettes     Quit date: 1980      Years since quittin.6    Smokeless tobacco: Never   Vaping Use    Vaping status: Never Used   Substance and Sexual Activity    Alcohol use: Yes     Alcohol/week: 0.0 standard drinks of alcohol     Comment: 1 a week     Drug use: No    Sexual activity: Yes     Partners: Male   Other Topics Concern    Caffeine Concern Yes     Comment: 1-3 c daily     Exercise No     Social Drivers of Health     Food Insecurity: Low Risk  (10/17/2023)    Received from [facility]    Food Insecurity     Have there been times that your food ran out, and you didn't have money to get more?: No     Are there times that you worry that this might happen?: No   Transportation Needs: Low Risk  (10/17/2023)    Received from [facility]    Transportation Needs     Do you have trouble getting transportation to medical appointments?: No     How do you normally get to and from your appointments?: Other   Housing Stability: Low Risk  (10/17/2023)    Received from [facility]    Housing Stability     Are you concerned about having a safe and reliable place to live?: No   Recent Concern: Housing Stability - At Risk (2023)    [facility]     Family History:  Family History   Problem Relation Age of Onset    Heart Disease Father     Diabetes Father     Other (CABG [Other]) Father     Cancer Father         Lung    Heart Disease Mother     Heart Attack Mother         AMI     Allergies:  Allergies[1]  Current Meds:  Current Outpatient Medications   Medication Sig Dispense Refill    Lovastatin 40 MG Oral Tab Take 1 tablet (40 mg total) by mouth nightly. 90 tablet 0    ESOMEPRAZOLE MAGNESIUM 40 MG Oral Capsule Delayed Release TAKE 1 CAPSULE(40 MG) BY MOUTH TWICE DAILY 60 capsule 0    HYDROCHLOROTHIAZIDE 25 MG Oral Tab TAKE 1 TABLET(25 MG) BY  MOUTH TWICE DAILY FOR DIURETIC 180 tablet 0    Zinc 50 MG Oral Cap Take by mouth.      Ascorbic Acid (VITAMIN C) 1000 MG Oral Tab Take 2 tablets (2,000 mg total) by mouth daily.      triamcinolone acetonide 0.1 % External Ointment Apply 1 Application topically 2 (two) times daily. 80 g 1    Hyaluronic Acid-Vitamin C (HYALURONIC ACID OR) Take 100 mg by mouth.      Aloe Oral Liquid Take by mouth. 4 ounces daily      Multiple Vitamins-Minerals (MULTIVITAMIN OR) Take  by mouth daily.      Omega-3 Fatty Acids (FISH OIL) 1200 MG Oral Capsule Delayed Release Take by mouth. (Patient not taking: Reported on 2/28/2025)        Counseling given: Not Answered       REVIEW OF SYSTEMS:   Consitutional: No fevers, chills, sweats  Eye: No recent visual problems  ENMT: No ear pain nasal congestion sore throat  Respiratory: No shortness of breath, cough  Cardiovascular: No chest pain palpitations syncope  Gastrointestinal: No nausea vomiting diarrhea  Genitourinary: No hematuria  Hema/Lymph no bruising tendency, swollen lymph glands  Endocrine: Negative for excessive thirst excessive hunger  Musculoskeletal: No back pain neck pain joint pain muscle pain decreased range of motion  Integumentary: No rash, pruritus, abrasions  Neurologic: Alert and oriented x4  Psychiatric: No anxiety, depression    Medical, surgical, family, and social histories were reviewed      EXAM:   VITALS:   Vitals:    02/28/25 1203   BP: 130/80   Pulse: 90   Resp: 16   Temp: 97.8 °F (36.6 °C)      GENERAL: well developed, well nourished, in no apparent distress  SKIN: no rashes, no suspicious lesions: Cool and Dry  HEENT: atraumatic, normocephalic, ears and throat are clear    Ears: TM's clear and visible bilaterally, no excess cerumen or erythema.   EYES: Pupils equal round and reactive.  Extraocular motions intact no scleral icterus no injection or drainage  THROAT without erythema tonsillar hypertrophy or exudate.  Uvula midline airway patent  NECK: Given  midline.  No JVD or lymphadenopathy supple nontender no meningeal signs   LUNGS: clear to auscultation sounds equal bilaterally no wheezes rales or rhonchi  CARDIO: Regular rate and rhythm without murmurs gallops or rubs  GI: Soft nontender nondistended no hepatomegaly palpable masses.  No guarding.   without deformity or crepitus no flank tenderness  EXTREMITIES: no cyanosis, clubbing or edema no joint tenderness effusion or edema noted.  No calf tenderness negative Homans' sign bilaterally  NEURO: Awake and alert.  Cranial nerves II through XII intact motor and sensory grossly within normal limits.  5 out of 5 muscle strength in all muscle groups.  Normal speech.  PSYCHIATRIC: Awake, alert and oriented x3, cooperative appropriate mood and affect.  Judgment intact       ASSESSMENT AND PLAN:   1. Preop general physical exam  - Cardio Referral - Internal  Patient is a low risk for a moderate risk surgery.  I did discuss with the patient that her EKG demonstrated a change from her previous EKG.  I did discuss I would like for her to follow-up with cardiology for clearance.  I did also discuss would like for her to update her blood work she need a CMP as well as lipid panel  2. Abnormal EKG  - Cardio Referral - Internal    3. Pure hypercholesterolemia  - Lovastatin 40 MG Oral Tab; Take 1 tablet (40 mg total) by mouth nightly.  Dispense: 90 tablet; Refill: 0  - Comp Metabolic Panel (14); Future  - Lipid Panel; Future       Meds & Refills for this Visit:  Requested Prescriptions     Signed Prescriptions Disp Refills    Lovastatin 40 MG Oral Tab 90 tablet 0     Sig: Take 1 tablet (40 mg total) by mouth nightly.       Health Maintenance:  Health Maintenance Due   Topic Date Due    Pneumococcal Vaccine: 50+ Years (1 of 1 - PCV) Never done    Zoster Vaccines (1 of 2) Never done    COVID-19 Vaccine (1 - 2024-25 season) Never done    Influenza Vaccine (1) 10/01/2024    Annual Depression Screening  01/01/2025    Fall  Risk Screening (Annual)  01/01/2025       Patient/Caregiver Education: Patient/Caregiver Education: There are no barriers to learning. Medical education done.   Outcome: Patient verbalizes understanding. Patient is notified to call with any questions, complications, allergies, or worsening or changing symptoms.  Patient is to call with any side effects or complications from the treatments as a result of today.     Problem List:  Patient Active Problem List   Diagnosis    Diffuse scleroderma (HCC)    Reflux esophagitis    Pure hypercholesterolemia    Raynaud's syndrome    Neck pain, bilateral    Acquired spondylolisthesis    Brachial neuritis or radiculitis NOS    Rash and nonspecific skin eruption    Tear of medial cartilage or meniscus of knee, current              GLOBAL EXP 12-24-14 / Right Knee / Ilia    Osteoarthrosis, unspecified whether generalized or localized, lower leg    Pre-operative clearance    Lumbar stenosis with neurogenic claudication    Cervical radiculopathy    At risk for falling    Status post right knee replacement    Vitamin D deficiency    Palpitations    Bilateral edema of lower extremity    Weight gain    Exertional dyspnea    Chest heaviness    Other fatigue    Post viral syndrome    Sclerodactyly    Contracture, palmar fascia         No follow-ups on file.     Corey Gonzalez MD    Please note that portions of this note may have been completed with a voice recognition program. Efforts were made to edit the dictations but occasionally words are mis-transcribed.       [1] No Known Allergies

## 2025-03-03 ENCOUNTER — PATIENT MESSAGE (OUTPATIENT)
Dept: FAMILY MEDICINE CLINIC | Facility: CLINIC | Age: 65
End: 2025-03-03

## 2025-03-06 NOTE — TELEPHONE ENCOUNTER
I understand your choice with this and it is your choice but you have been running this elevated with your LDL for a number of years your risk is very high

## 2025-03-12 ENCOUNTER — TELEPHONE (OUTPATIENT)
Dept: FAMILY MEDICINE CLINIC | Facility: CLINIC | Age: 65
End: 2025-03-12

## 2025-03-12 NOTE — TELEPHONE ENCOUNTER
Viviana from Dr. Olmstead office is requesting for a copy of EKG tracing done 2/27, requesting to fax the tracings and not the report. Patient is scheduled for surgery on 3/18. Fax # [phone]. EKG tracings faxed.

## 2025-04-22 ENCOUNTER — MED REC SCAN ONLY (OUTPATIENT)
Dept: FAMILY MEDICINE CLINIC | Facility: CLINIC | Age: 65
End: 2025-04-22

## 2025-04-28 DIAGNOSIS — K21.00 REFLUX ESOPHAGITIS: ICD-10-CM

## 2025-04-30 RX ORDER — ESOMEPRAZOLE MAGNESIUM 40 MG/1
40 CAPSULE, DELAYED RELEASE ORAL 2 TIMES DAILY
Qty: 60 CAPSULE | Refills: 0 | Status: SHIPPED | OUTPATIENT
Start: 2025-04-30

## 2025-04-30 NOTE — TELEPHONE ENCOUNTER
Requested Prescriptions     Pending Prescriptions Disp Refills    ESOMEPRAZOLE MAGNESIUM 40 MG Oral Capsule Delayed Release [Pharmacy Med Name: ESOMEPRAZOLE MAGNESIUM 40MG DR CAPS] 60 capsule 0     Sig: TAKE 1 CAPSULE(40 MG) BY MOUTH TWICE DAILY     Last refill 2/6/25 #60  LOV 2/28/25  Future Appointments   None

## 2025-05-23 ENCOUNTER — LAB ENCOUNTER (OUTPATIENT)
Dept: LAB | Age: 65
End: 2025-05-23
Attending: FAMILY MEDICINE
Payer: COMMERCIAL

## 2025-05-23 DIAGNOSIS — E78.00 PURE HYPERCHOLESTEROLEMIA: ICD-10-CM

## 2025-05-23 LAB
ALBUMIN SERPL-MCNC: 4.8 G/DL (ref 3.2–4.8)
ALBUMIN/GLOB SERPL: 1.9 {RATIO} (ref 1–2)
ALP LIVER SERPL-CCNC: 64 U/L (ref 50–130)
ALT SERPL-CCNC: 14 U/L (ref 10–49)
ANION GAP SERPL CALC-SCNC: 8 MMOL/L (ref 0–18)
AST SERPL-CCNC: 23 U/L (ref ?–34)
BILIRUB SERPL-MCNC: 0.5 MG/DL (ref 0.2–1.1)
BUN BLD-MCNC: 10 MG/DL (ref 9–23)
CALCIUM BLD-MCNC: 9.9 MG/DL (ref 8.7–10.6)
CHLORIDE SERPL-SCNC: 105 MMOL/L (ref 98–112)
CHOLEST SERPL-MCNC: 253 MG/DL (ref ?–200)
CO2 SERPL-SCNC: 31 MMOL/L (ref 21–32)
CREAT BLD-MCNC: 0.73 MG/DL (ref 0.55–1.02)
EGFRCR SERPLBLD CKD-EPI 2021: 91 ML/MIN/1.73M2 (ref 60–?)
FASTING PATIENT LIPID ANSWER: YES
FASTING STATUS PATIENT QL REPORTED: YES
GLOBULIN PLAS-MCNC: 2.5 G/DL (ref 2–3.5)
GLUCOSE BLD-MCNC: 85 MG/DL (ref 70–99)
HDLC SERPL-MCNC: 69 MG/DL (ref 40–59)
LDLC SERPL CALC-MCNC: 165 MG/DL (ref ?–100)
NONHDLC SERPL-MCNC: 184 MG/DL (ref ?–130)
OSMOLALITY SERPL CALC.SUM OF ELEC: 296 MOSM/KG (ref 275–295)
POTASSIUM SERPL-SCNC: 4.3 MMOL/L (ref 3.5–5.1)
PROT SERPL-MCNC: 7.3 G/DL (ref 5.7–8.2)
SODIUM SERPL-SCNC: 144 MMOL/L (ref 136–145)
TRIGL SERPL-MCNC: 108 MG/DL (ref 30–149)
VLDLC SERPL CALC-MCNC: 21 MG/DL (ref 0–30)

## 2025-05-23 PROCEDURE — 80061 LIPID PANEL: CPT

## 2025-05-23 PROCEDURE — 80053 COMPREHEN METABOLIC PANEL: CPT

## 2025-05-23 PROCEDURE — 36415 COLL VENOUS BLD VENIPUNCTURE: CPT

## 2025-05-25 DIAGNOSIS — K21.00 REFLUX ESOPHAGITIS: ICD-10-CM

## 2025-05-28 RX ORDER — ESOMEPRAZOLE MAGNESIUM 40 MG/1
40 CAPSULE, DELAYED RELEASE ORAL 2 TIMES DAILY
Qty: 60 CAPSULE | Refills: 0 | Status: SHIPPED | OUTPATIENT
Start: 2025-05-28

## 2025-06-03 ENCOUNTER — OFFICE VISIT (OUTPATIENT)
Dept: FAMILY MEDICINE CLINIC | Facility: CLINIC | Age: 65
End: 2025-06-03
Payer: COMMERCIAL

## 2025-06-03 VITALS
HEIGHT: 64 IN | TEMPERATURE: 98 F | DIASTOLIC BLOOD PRESSURE: 80 MMHG | HEART RATE: 65 BPM | WEIGHT: 168 LBS | BODY MASS INDEX: 28.68 KG/M2 | RESPIRATION RATE: 18 BRPM | SYSTOLIC BLOOD PRESSURE: 132 MMHG

## 2025-06-03 DIAGNOSIS — E78.00 PURE HYPERCHOLESTEROLEMIA: Primary | ICD-10-CM

## 2025-06-03 PROCEDURE — G2211 COMPLEX E/M VISIT ADD ON: HCPCS | Performed by: FAMILY MEDICINE

## 2025-06-03 PROCEDURE — 99214 OFFICE O/P EST MOD 30 MIN: CPT | Performed by: FAMILY MEDICINE

## 2025-06-03 PROCEDURE — 3008F BODY MASS INDEX DOCD: CPT | Performed by: FAMILY MEDICINE

## 2025-06-03 PROCEDURE — 3079F DIAST BP 80-89 MM HG: CPT | Performed by: FAMILY MEDICINE

## 2025-06-03 PROCEDURE — 3075F SYST BP GE 130 - 139MM HG: CPT | Performed by: FAMILY MEDICINE

## 2025-06-03 RX ORDER — ASPIRIN 81 MG/1
81 TABLET, COATED ORAL EVERY MORNING
COMMUNITY

## 2025-06-03 NOTE — PROGRESS NOTES
[facility] Office Note  Chief Complaint:   Chief Complaint   Patient presents with    Medication Follow-Up       HPI:   This is a 64 year old female coming in for lab follow-up.  The patient states that she has been continuing to watch her diet.  She has been losing weight.  She denies any acute concerns today.      Past Medical History[1]  Past Surgical History[2]  Social History:  Short Social Hx on File[3]  Family History:  Family History[4]  Allergies:  Allergies[5]  Current Meds:  Current Medications[6]   Counseling given: Not Answered       REVIEW OF SYSTEMS:   Consitutional: No fevers, chills, sweats  Eye: No recent visual problems  ENMT: No ear pain nasal congestion sore throat  Respiratory: No shortness of breath, cough  Cardiovascular: No chest pain palpitations syncope  Gastrointestinal: No nausea vomiting diarrhea  Genitourinary: No hematuria  Hema/Lymph no bruising tendency, swollen lymph glands       Medical, surgical, family, and social histories were reviewed      EXAM:     VITALS:   Vitals:    06/03/25 1534   BP: 132/80   Pulse: 65   Resp: 18   Temp: 97.5 °F (36.4 °C)      GENERAL: well developed, well nourished, in no apparent distress  SKIN: no rashes, no suspicious lesions: Cool and Dry  HEENT: atraumatic, normocephalic, ears and throat are clear    Ears: TM's clear and visible bilaterally, no excess cerumen or erythema.   EYES: Pupils equal round and reactive.  Extraocular motions intact no scleral icterus no injection or drainage  THROAT without erythema tonsillar hypertrophy or exudate.  Uvula midline airway patent  NECK: Given midline.  No JVD or lymphadenopathy supple nontender no meningeal signs   LUNGS: clear to auscultation sounds equal bilaterally no wheezes rales or rhonchi  CARDIO: Regular rate and rhythm without murmurs gallops or rubs       ASSESSMENT AND PLAN:   1. Pure hypercholesterolemia  - Comp Metabolic Panel (14); Future  - Lipid Panel; Future  I do  long discussion with the patient in regards to her LDL and it has improved but is still elevated.  She recently did have a heart scan completed which did show severe atherosclerotic plaques.  I did discuss with her at this time I do feel she needs to be on a statin.  The patient is continually reluctant in starting this medication.  I did discuss with the patient that this can lead to heart attack as well as stroke.  The patient states that she does not want to initiate any treatment for this at this point that she would like to continue with modifying her diet.  Did discuss that we would repeat her blood work in the next 6 months she will need a CMP as well as lipid panel.    Meds & Refills for this Visit:  Requested Prescriptions      No prescriptions requested or ordered in this encounter       Health Maintenance:  Health Maintenance Due   Topic Date Due    Pneumococcal Vaccine: 50+ Years (1 of 1 - PCV) Never done    Zoster Vaccines (1 of 2) Never done    COVID-19 Vaccine (1 - 2024-25 season) Never done    Annual Depression Screening  01/01/2025    Fall Risk Screening (Annual)  01/01/2025    Annual Physical  07/02/2025       Patient/Caregiver Education: Patient/Caregiver Education: There are no barriers to learning. Medical education done.   Outcome: Patient verbalizes understanding. Patient is notified to call with any questions, complications, allergies, or worsening or changing symptoms.  Patient is to call with any side effects or complications from the treatments as a result of today.     Problem List:  Problem List[7]      No follow-ups on file.     Corey Gonzalez MD    Please note that portions of this note may have been completed with a voice recognition program. Efforts were made to edit the dictations but occasionally words are mis-transcribed.       [1]   Past Medical History:   Anemia, unspecified    Back problem    Chest pain, unspecified    Cough    Degeneration of lumbar or lumbosacral  intervertebral disc    Degeneration of meniscus of right knee    Dysphagia, unspecified(787.20)    Esophageal reflux    Glucose intolerance (pre-diabetes)    High cholesterol    Lumbago    Neoplasm of uncertain behavior of skin    OTHER DISEASES    scleroderma    Other malaise and fatigue    Pure hypercholesterolemia    Scleroderma (HCC)    Visual impairment    glasses   [2]   Past Surgical History:  Procedure Laterality Date    Back surgery      laminectomy ,     Back surgery  2015    Lumbar fusion, Dr. Palacio          x4    Colonoscopy  06    Colonoscopy N/A 3/13/2016    Procedure: COLONOSCOPY;  Surgeon: Di Blackwell DO;  Location:  ENDOSCOPY    Dexa,bone density,axial skeleton  2003    Endometrial ablation      Hysterectomy  05/03/10    TVH, Rt. salpingectomy    Hysteroscopy,with endometrial  09    Performed by FABRIZIO SIM at [facility] [place]    Knee replacement surgery Right 3/7/2016    Other surgical history  06    esophagogastroduodenoscopy    Other surgical history Left 2014    toe surgery-bunionectomy, fusion of toe   [3]   Social History  Socioeconomic History    Marital status:    Tobacco Use    Smoking status: Former     Current packs/day: 0.00     Types: Cigarettes     Quit date: 1980     Years since quittin.9    Smokeless tobacco: Never   Vaping Use    Vaping status: Never Used   Substance and Sexual Activity    Alcohol use: Yes     Alcohol/week: 0.0 standard drinks of alcohol     Comment: 1 a week     Drug use: No    Sexual activity: Yes     Partners: Male   Other Topics Concern    Caffeine Concern Yes     Comment: 1-3 c daily     Exercise No     Social Drivers of Health     Food Insecurity: Low Risk  (10/17/2023)    Received from [facility]    Food Insecurity     Have there been times that your food ran out, and you didn't have money to get more?: No     Are there times that you worry that this  might happen?: No   Transportation Needs: Low Risk  (10/17/2023)    Received from [facility]    Transportation Needs     Do you have trouble getting transportation to medical appointments?: No     How do you normally get to and from your appointments?: Other   Housing Stability: Low Risk  (10/17/2023)    Received from [facility]    Housing Stability     Are you concerned about having a safe and reliable place to live?: No   Recent Concern: Housing Stability - At Risk (8/18/2023)    Received from [facility] Housing   [4]   Family History  Problem Relation Age of Onset    Heart Disease Father     Diabetes Father     Other (CABG [Other]) Father     Cancer Father         Lung    Heart Disease Mother     Heart Attack Mother         AMI   [5] No Known Allergies  [6]   Current Outpatient Medications   Medication Sig Dispense Refill    ASPIRIN LOW DOSE 81 MG Oral Tab EC Take 1 tablet (81 mg total) by mouth every morning.      cholecalciferol 125 MCG (5000 UT) Oral Tab cholecalciferol (vitamin D3) 125 mcg (5,000 unit) tablet, [RxNorm: 089563]      ESOMEPRAZOLE MAGNESIUM 40 MG Oral Capsule Delayed Release TAKE 1 CAPSULE(40 MG) BY MOUTH TWICE DAILY 60 capsule 0    HYDROCHLOROTHIAZIDE 25 MG Oral Tab TAKE 1 TABLET(25 MG) BY MOUTH TWICE DAILY FOR DIURETIC 180 tablet 0    Zinc 50 MG Oral Cap Take by mouth.      Ascorbic Acid (VITAMIN C) 1000 MG Oral Tab Take 2 tablets (2,000 mg total) by mouth daily.      triamcinolone acetonide 0.1 % External Ointment Apply 1 Application topically 2 (two) times daily. 80 g 1    Hyaluronic Acid-Vitamin C (HYALURONIC ACID OR) Take 100 mg by mouth.      Omega-3 Fatty Acids (FISH OIL) 1200 MG Oral Capsule Delayed Release Take by mouth.      Aloe Oral Liquid Take by mouth. 4 ounces daily      Multiple Vitamins-Minerals (MULTIVITAMIN OR) Take  by mouth daily.     [7]   Patient Active Problem List  Diagnosis    Diffuse scleroderma (HCC)    Reflux  esophagitis    Pure hypercholesterolemia    Raynaud's syndrome    Neck pain, bilateral    Acquired spondylolisthesis    Brachial neuritis or radiculitis NOS    Rash and nonspecific skin eruption    Tear of medial cartilage or meniscus of knee, current              GLOBAL EXP 12-24-14 / Right Knee / Ilia    Osteoarthrosis, unspecified whether generalized or localized, lower leg    Pre-operative clearance    Lumbar stenosis with neurogenic claudication    Cervical radiculopathy    At risk for falling    Status post right knee replacement    Vitamin D deficiency    Palpitations    Bilateral edema of lower extremity    Weight gain    Exertional dyspnea    Chest heaviness    Other fatigue    Post viral syndrome    Sclerodactyly    Contracture, palmar fascia

## 2025-06-23 DIAGNOSIS — K21.00 REFLUX ESOPHAGITIS: ICD-10-CM

## 2025-06-24 RX ORDER — ESOMEPRAZOLE MAGNESIUM 40 MG/1
40 CAPSULE, DELAYED RELEASE ORAL 2 TIMES DAILY
Qty: 180 CAPSULE | Refills: 0 | Status: SHIPPED | OUTPATIENT
Start: 2025-06-24

## 2025-06-24 NOTE — TELEPHONE ENCOUNTER
Last Office Visit: 06/03/2025    Last Refill:   Medication Quantity Refills Start End   ESOMEPRAZOLE MAGNESIUM 40 MG Oral Capsule Delayed Release 60 capsule 0 5/28/2025 --     Return to Clinic: 12/03/2025    Protocol: passed

## (undated) NOTE — MR AVS SNAPSHOT
[facility] [address], [address] [address] [phone]               Thank you for choosing us for your health care visit with Dominique Phelan MD.  We are glad to serve you and happy to provide you with this vargas Reason for Today's Visit     Cough     Nasal Congestion     Shortness Of Breath     Edema           Medical Issues Discussed Today     Persistent cough for 3 weeks or longer    -  Primary    Dyspnea on exertion          Instructions and Information about Y Summaries. If you've been to the Emergency Department or your doctor's office, you can view your past visit information in [employer] by going to Visits < Visit Summaries. [employer] questions? Call [phone] for help.   [employer] is NOT to be used for urge

## (undated) NOTE — Clinical Note
Angelica Kirk! Hope you are doing well! Thank you for referring Ms. Ralph Zavala for rheumatologic evaluation. Please see the discussion portion of my note and let me know if you have any questions.    Mayela Larson, DO [facility] 9/19/2023

## (undated) NOTE — MR AVS SNAPSHOT
[facility] [address], [address] [address] [phone]               Thank you for choosing us for your health care visit with Rohith Solomon MD.  We are glad to serve you and happy to provide you with this vargas Where to Get Your Medications      These medications were sent to [facility], [address] [phone], [address] [facility], [address] [address]     Phone:  [phone]    - atorvastatin 40 MG Tabs  - hydrochlorot ? Install sturdy handrails on both sides. ? Make sure carpeting is secure. FLOORS:  ? Remove all loose wires, cords and throw rugs. ? Keep floors clear of clutter. ? Make sure carpets and area rugs have skid proof backing.   ? Do not use slippery wax on

## (undated) NOTE — Clinical Note
Please check insurance approval for PFTs and ECHO and let pt know when able to schedule.    Talia Wise, DO [facility] 9/19/2023

## (undated) NOTE — LETTER
06/25/21        Martha Iq[place] [id number]      Dear Judith Beltre,    [address] [facility] records indicate that you have outstanding lab work and or testing that was ordered for you and has not yet been completed:  Orders Placed This Encounter

## (undated) NOTE — Clinical Note
FYI, TCM call made, see notes. [facility] offered patient a sooner appointment at the [facility] clinic patient declined. Patient reports she has a hospital follow up scheduled with her PCP for this Friday and she will be out of town for a couple of days this week.  HFU

## (undated) NOTE — Clinical Note
NADIR Jackson. I saw Brandy earlier this week. Please reach out to her re: her xray results from February. She was not able to get MRI done and was told by the  your office would do the PA? She was frustrated she had not gotten the results yet (just as a fyi).   Iza June, DO [facility] 3/22/2024